# Patient Record
Sex: MALE | Race: WHITE | Employment: OTHER | URBAN - NONMETROPOLITAN AREA
[De-identification: names, ages, dates, MRNs, and addresses within clinical notes are randomized per-mention and may not be internally consistent; named-entity substitution may affect disease eponyms.]

---

## 2018-11-14 PROBLEM — Z95.820 S/P ANGIOPLASTY WITH STENT: Status: ACTIVE | Noted: 2018-11-14

## 2018-11-14 PROBLEM — E78.00 PURE HYPERCHOLESTEROLEMIA: Status: ACTIVE | Noted: 2018-11-14

## 2018-11-14 PROBLEM — I25.10 CORONARY ARTERY DISEASE INVOLVING NATIVE CORONARY ARTERY OF NATIVE HEART WITHOUT ANGINA PECTORIS: Status: ACTIVE | Noted: 2018-11-14

## 2020-01-09 PROBLEM — R00.0 SINUS TACHYCARDIA: Status: ACTIVE | Noted: 2020-01-09

## 2020-05-05 PROBLEM — R60.0 BILATERAL LEG EDEMA: Status: ACTIVE | Noted: 2020-05-05

## 2020-05-05 PROBLEM — I50.41 ACUTE COMBINED SYSTOLIC AND DIASTOLIC CONGESTIVE HEART FAILURE (HCC): Status: ACTIVE | Noted: 2020-05-05

## 2020-05-12 PROBLEM — N17.9 AKI (ACUTE KIDNEY INJURY) (HCC): Status: ACTIVE | Noted: 2020-05-12

## 2020-07-09 PROBLEM — I50.21 ACUTE SYSTOLIC CONGESTIVE HEART FAILURE (HCC): Status: ACTIVE | Noted: 2020-05-05

## 2020-07-09 PROBLEM — J44.1 COPD WITH ACUTE EXACERBATION (HCC): Status: ACTIVE | Noted: 2020-07-09

## 2020-07-09 PROBLEM — I50.23 ACUTE ON CHRONIC SYSTOLIC (CONGESTIVE) HEART FAILURE (HCC): Status: ACTIVE | Noted: 2020-07-09

## 2020-07-10 ENCOUNTER — PREP FOR PROCEDURE (OUTPATIENT)
Dept: CARDIOLOGY | Age: 65
End: 2020-07-10

## 2020-07-10 RX ORDER — SODIUM CHLORIDE 0.9 % (FLUSH) 0.9 %
10 SYRINGE (ML) INJECTION EVERY 12 HOURS SCHEDULED
Status: CANCELLED | OUTPATIENT
Start: 2020-07-10

## 2020-07-10 RX ORDER — SODIUM CHLORIDE 9 MG/ML
INJECTION, SOLUTION INTRAVENOUS CONTINUOUS
Status: CANCELLED | OUTPATIENT
Start: 2020-07-10

## 2020-07-10 RX ORDER — ASPIRIN 325 MG
325 TABLET ORAL ONCE
Status: CANCELLED | OUTPATIENT
Start: 2020-07-10 | End: 2020-07-10

## 2020-07-10 RX ORDER — DIPHENHYDRAMINE HYDROCHLORIDE 50 MG/ML
50 INJECTION INTRAMUSCULAR; INTRAVENOUS ONCE
Status: CANCELLED | OUTPATIENT
Start: 2020-07-10 | End: 2020-07-10

## 2020-07-10 RX ORDER — NITROGLYCERIN 0.4 MG/1
0.4 TABLET SUBLINGUAL EVERY 5 MIN PRN
Status: CANCELLED | OUTPATIENT
Start: 2020-07-10

## 2020-07-10 RX ORDER — SODIUM CHLORIDE 0.9 % (FLUSH) 0.9 %
10 SYRINGE (ML) INJECTION PRN
Status: CANCELLED | OUTPATIENT
Start: 2020-07-10

## 2020-07-16 PROBLEM — R94.39 ABNORMAL NUCLEAR STRESS TEST: Status: ACTIVE | Noted: 2020-07-16

## 2020-07-16 PROBLEM — I25.5 ISCHEMIC CARDIOMYOPATHY: Status: ACTIVE | Noted: 2020-07-16

## 2020-07-16 PROBLEM — N18.30 CKD (CHRONIC KIDNEY DISEASE), STAGE III (HCC): Status: ACTIVE | Noted: 2020-07-16

## 2020-07-16 PROBLEM — I50.42 CHRONIC COMBINED SYSTOLIC AND DIASTOLIC CONGESTIVE HEART FAILURE (HCC): Status: ACTIVE | Noted: 2020-07-16

## 2020-07-22 PROBLEM — K02.9 DENTAL CARIES NOTED ON EXAMINATION: Status: ACTIVE | Noted: 2017-10-12

## 2020-07-22 PROBLEM — N52.9 IMPOTENCE OF ORGANIC ORIGIN: Status: ACTIVE | Noted: 2017-01-25

## 2020-07-22 PROBLEM — G47.33 OBSTRUCTIVE SLEEP APNEA SYNDROME: Status: ACTIVE | Noted: 2019-10-15

## 2020-07-22 PROBLEM — E66.9 SIMPLE OBESITY: Status: ACTIVE | Noted: 2019-10-15

## 2020-07-22 PROBLEM — R69 ILLNESS, UNSPECIFIED: Status: ACTIVE | Noted: 2019-10-15

## 2020-07-23 PROBLEM — L97.909 VENOUS ULCER OF LOWER LEG WITHOUT VARICOSE VEINS (HCC): Status: ACTIVE | Noted: 2020-07-23

## 2020-07-23 PROBLEM — E66.01 CLASS 2 SEVERE OBESITY WITH SERIOUS COMORBIDITY AND BODY MASS INDEX (BMI) OF 38.0 TO 38.9 IN ADULT (HCC): Status: ACTIVE | Noted: 2017-11-15

## 2020-07-23 PROBLEM — I83.009 VENOUS ULCER (HCC): Status: ACTIVE | Noted: 2020-07-23

## 2020-07-23 PROBLEM — L97.919 VENOUS ULCER OF RIGHT LEG (HCC): Status: ACTIVE | Noted: 2020-07-23

## 2020-07-23 PROBLEM — L97.909 VENOUS ULCER (HCC): Status: ACTIVE | Noted: 2020-07-23

## 2020-07-23 PROBLEM — I83.019 VENOUS ULCER OF RIGHT LEG (HCC): Status: ACTIVE | Noted: 2020-07-23

## 2020-07-23 PROBLEM — I87.2 VENOUS ULCER OF LOWER LEG WITHOUT VARICOSE VEINS (HCC): Status: ACTIVE | Noted: 2020-07-23

## 2020-08-13 PROBLEM — L97.929 VENOUS STASIS ULCER OF LEFT LOWER LEG WITH EDEMA OF LEFT LOWER LEG (HCC): Status: ACTIVE | Noted: 2020-08-13

## 2020-08-13 PROBLEM — R60.0 VENOUS STASIS ULCER OF LEFT LOWER LEG WITH EDEMA OF LEFT LOWER LEG (HCC): Status: ACTIVE | Noted: 2020-08-13

## 2020-08-13 PROBLEM — I83.891 VENOUS STASIS ULCER OF RIGHT LOWER LEG WITH EDEMA OF RIGHT LOWER LEG (HCC): Status: ACTIVE | Noted: 2020-07-23

## 2020-08-13 PROBLEM — L97.919 VENOUS STASIS ULCER OF RIGHT LOWER LEG WITH EDEMA OF RIGHT LOWER LEG (HCC): Status: ACTIVE | Noted: 2020-07-23

## 2020-08-13 PROBLEM — I83.029 VENOUS STASIS ULCER OF LEFT LOWER LEG WITH EDEMA OF LEFT LOWER LEG (HCC): Status: ACTIVE | Noted: 2020-08-13

## 2020-08-13 PROBLEM — R60.0 VENOUS STASIS ULCER OF RIGHT LOWER LEG WITH EDEMA OF RIGHT LOWER LEG (HCC): Status: ACTIVE | Noted: 2020-07-23

## 2020-08-13 PROBLEM — I25.10 CORONARY ARTERY DISEASE INVOLVING NATIVE CORONARY ARTERY OF NATIVE HEART: Status: ACTIVE | Noted: 2020-08-13

## 2020-08-13 PROBLEM — R60.9 VENOUS STASIS ULCER OF LEFT LOWER LEG WITH EDEMA OF LEFT LOWER LEG (HCC): Status: ACTIVE | Noted: 2020-08-13

## 2020-08-13 PROBLEM — I83.009 VENOUS ULCER (HCC): Status: RESOLVED | Noted: 2020-07-23 | Resolved: 2020-08-13

## 2020-08-13 PROBLEM — R06.02 SOB (SHORTNESS OF BREATH) ON EXERTION: Status: ACTIVE | Noted: 2020-08-13

## 2020-08-13 PROBLEM — L97.909 VENOUS ULCER (HCC): Status: RESOLVED | Noted: 2020-07-23 | Resolved: 2020-08-13

## 2020-08-13 PROBLEM — I83.019 VENOUS STASIS ULCER OF RIGHT LOWER LEG WITH EDEMA OF RIGHT LOWER LEG (HCC): Status: ACTIVE | Noted: 2020-07-23

## 2020-08-18 ENCOUNTER — PREP FOR PROCEDURE (OUTPATIENT)
Dept: CARDIOLOGY | Age: 65
End: 2020-08-18

## 2020-08-18 RX ORDER — DIPHENHYDRAMINE HYDROCHLORIDE 50 MG/ML
50 INJECTION INTRAMUSCULAR; INTRAVENOUS ONCE
Status: CANCELLED | OUTPATIENT
Start: 2020-08-18 | End: 2020-08-18

## 2020-08-18 RX ORDER — SODIUM CHLORIDE 0.9 % (FLUSH) 0.9 %
10 SYRINGE (ML) INJECTION PRN
Status: CANCELLED | OUTPATIENT
Start: 2020-08-18

## 2020-08-18 RX ORDER — SODIUM CHLORIDE 9 MG/ML
INJECTION, SOLUTION INTRAVENOUS CONTINUOUS
Status: CANCELLED | OUTPATIENT
Start: 2020-08-18

## 2020-08-18 RX ORDER — NITROGLYCERIN 0.4 MG/1
0.4 TABLET SUBLINGUAL EVERY 5 MIN PRN
Status: CANCELLED | OUTPATIENT
Start: 2020-08-18

## 2020-08-18 RX ORDER — SODIUM CHLORIDE 0.9 % (FLUSH) 0.9 %
10 SYRINGE (ML) INJECTION EVERY 12 HOURS SCHEDULED
Status: CANCELLED | OUTPATIENT
Start: 2020-08-18

## 2020-08-18 RX ORDER — ASPIRIN 325 MG
325 TABLET ORAL ONCE
Status: CANCELLED | OUTPATIENT
Start: 2020-08-18 | End: 2020-08-18

## 2020-08-19 PROBLEM — I25.83 CORONARY ARTERY DISEASE DUE TO LIPID RICH PLAQUE: Status: ACTIVE | Noted: 2020-08-19

## 2020-08-19 PROBLEM — I25.10 CORONARY ARTERY DISEASE DUE TO LIPID RICH PLAQUE: Status: ACTIVE | Noted: 2020-08-19

## 2020-08-27 PROBLEM — R60.0 VENOUS STASIS ULCER OF LEFT LOWER LEG WITH EDEMA OF LEFT LOWER LEG (HCC): Status: RESOLVED | Noted: 2020-08-13 | Resolved: 2020-08-27

## 2020-08-27 PROBLEM — L97.919 VENOUS ULCER OF RIGHT LEG (HCC): Status: RESOLVED | Noted: 2020-07-23 | Resolved: 2020-08-27

## 2020-08-27 PROBLEM — I83.892 VENOUS STASIS ULCER OF LEFT LOWER LEG WITH EDEMA OF LEFT LOWER LEG (HCC): Status: RESOLVED | Noted: 2020-08-13 | Resolved: 2020-08-27

## 2020-08-27 PROBLEM — I83.029 VENOUS STASIS ULCER OF LEFT LOWER LEG WITH EDEMA OF LEFT LOWER LEG (HCC): Status: RESOLVED | Noted: 2020-08-13 | Resolved: 2020-08-27

## 2020-08-27 PROBLEM — R60.9 VENOUS STASIS ULCER OF LEFT LOWER LEG WITH EDEMA OF LEFT LOWER LEG (HCC): Status: RESOLVED | Noted: 2020-08-13 | Resolved: 2020-08-27

## 2020-08-27 PROBLEM — L97.929 VENOUS STASIS ULCER OF LEFT LOWER LEG WITH EDEMA OF LEFT LOWER LEG (HCC): Status: RESOLVED | Noted: 2020-08-13 | Resolved: 2020-08-27

## 2020-08-27 PROBLEM — E66.01 CLASS 2 SEVERE OBESITY DUE TO EXCESS CALORIES WITH SERIOUS COMORBIDITY IN ADULT (HCC): Status: ACTIVE | Noted: 2019-10-15

## 2020-10-05 ENCOUNTER — PREP FOR PROCEDURE (OUTPATIENT)
Dept: CARDIOLOGY | Age: 65
End: 2020-10-05

## 2020-10-05 RX ORDER — SODIUM CHLORIDE 0.9 % (FLUSH) 0.9 %
10 SYRINGE (ML) INJECTION EVERY 12 HOURS SCHEDULED
Status: CANCELLED | OUTPATIENT
Start: 2020-10-05

## 2020-10-05 RX ORDER — ASPIRIN 325 MG
325 TABLET ORAL ONCE
Status: CANCELLED | OUTPATIENT
Start: 2020-10-05 | End: 2020-10-05

## 2020-10-05 RX ORDER — NITROGLYCERIN 0.4 MG/1
0.4 TABLET SUBLINGUAL EVERY 5 MIN PRN
Status: CANCELLED | OUTPATIENT
Start: 2020-10-05

## 2020-10-05 RX ORDER — DIPHENHYDRAMINE HYDROCHLORIDE 50 MG/ML
50 INJECTION INTRAMUSCULAR; INTRAVENOUS ONCE
Status: CANCELLED | OUTPATIENT
Start: 2020-10-05 | End: 2020-10-05

## 2020-10-05 RX ORDER — SODIUM CHLORIDE 9 MG/ML
50 INJECTION, SOLUTION INTRAVENOUS CONTINUOUS
Status: CANCELLED | OUTPATIENT
Start: 2020-10-05

## 2020-10-05 RX ORDER — SODIUM CHLORIDE 0.9 % (FLUSH) 0.9 %
10 SYRINGE (ML) INJECTION PRN
Status: CANCELLED | OUTPATIENT
Start: 2020-10-05

## 2020-11-03 PROBLEM — I25.10 CORONARY ARTERY DISEASE INVOLVING NATIVE CORONARY ARTERY OF NATIVE HEART: Status: RESOLVED | Noted: 2020-08-13 | Resolved: 2020-11-03

## 2020-11-03 PROBLEM — N17.9 AKI (ACUTE KIDNEY INJURY) (HCC): Status: RESOLVED | Noted: 2020-05-12 | Resolved: 2020-11-03

## 2021-01-18 PROBLEM — Z96.659 HISTORY OF ARTHROPLASTY OF KNEE: Status: ACTIVE | Noted: 2018-03-13

## 2021-01-18 PROBLEM — E03.9 ACQUIRED HYPOTHYROIDISM: Status: ACTIVE | Noted: 2017-11-15

## 2021-01-18 PROBLEM — E66.3 OVERWEIGHT WITH BODY MASS INDEX (BMI) OF 27 TO 27.9 IN ADULT: Status: ACTIVE | Noted: 2019-10-15

## 2021-01-18 PROBLEM — K05.30 CHRONIC PERIODONTITIS, UNSPECIFIED: Status: ACTIVE | Noted: 2017-10-12

## 2021-01-18 PROBLEM — S81.809A OPEN WOUND OF LOWER LEG: Status: ACTIVE | Noted: 2021-01-05

## 2021-01-18 PROBLEM — F17.200 TOBACCO USE DISORDER: Status: ACTIVE | Noted: 2017-11-15

## 2021-01-18 PROBLEM — R35.1 NOCTURIA: Status: ACTIVE | Noted: 2018-03-13

## 2022-11-03 DIAGNOSIS — N18.32 CHRONIC KIDNEY DISEASE, STAGE 3B (HCC): ICD-10-CM

## 2022-11-03 LAB
ANION GAP SERPL CALCULATED.3IONS-SCNC: 13 MEQ/L (ref 8–16)
BUN BLDV-MCNC: 27 MG/DL (ref 7–22)
CALCIUM SERPL-MCNC: 9.6 MG/DL (ref 8.5–10.5)
CHLORIDE BLD-SCNC: 99 MEQ/L (ref 98–111)
CO2: 30 MEQ/L (ref 23–33)
CREAT SERPL-MCNC: 1.8 MG/DL (ref 0.4–1.2)
GFR SERPL CREATININE-BSD FRML MDRD: 40 ML/MIN/1.73M2
GLUCOSE BLD-MCNC: 87 MG/DL (ref 70–108)
POTASSIUM SERPL-SCNC: 3.9 MEQ/L (ref 3.5–5.2)
SODIUM BLD-SCNC: 142 MEQ/L (ref 135–145)

## 2022-11-09 NOTE — TELEPHONE ENCOUNTER
Reviewed with Dr Ciara Vasques: [facility] to switch to Effient 10 mg QD. LM for pt to return call. Please agree with verbal order.

## 2022-11-10 ENCOUNTER — TELEPHONE (OUTPATIENT)
Dept: CARDIOLOGY CLINIC | Age: 67
End: 2022-11-10

## 2022-11-10 NOTE — LETTER
[facility]  [facility]  [address]  [facility] [address]  Phone: [phone]  Fax: [phone]    IVANIA Causey CNP        November 10, 2022     [facility] [address]      Dear Shannan Bain: We missed seeing you for a scheduled appointment at [facility] with IVANIA Causey CNP on 11/10/2022. We're sorry you were unable to keep your appointment and hope that you are doing well. We ask that you please call 24 hours in advance if you are unable to make your appointment, so that we can give that time to another patient in need. We care about you and the management of your healthcare and want to make sure that you follow up as recommended. To provide quality care and timely appointments to all our patients, you may be dismissed from the practice if you do not show for three (3) scheduled appointments within a 12-month period. We would like to continue treating your healthcare needs. Please call the office to reschedule your appointment, if needed.      Sincerely,        IVANIA Causey CNP

## 2022-11-10 NOTE — Clinical Note
[facility]  [address] [address]  Phone: [phone]  Fax: [phone]    IVANIA Bosch CNP        November 10, 2022    [facility] [address]      Dear Carolina Figueroa:    ***    If you have any questions or concerns, please don't hesitate to call.     Sincerely,        IVANIA Bosch CNP

## 2022-11-14 RX ORDER — PRASUGREL 10 MG/1
10 TABLET, FILM COATED ORAL DAILY
Qty: 90 TABLET | Refills: 3 | Status: SHIPPED | OUTPATIENT
Start: 2022-11-14

## 2022-12-12 RX ORDER — ATORVASTATIN CALCIUM 40 MG/1
TABLET, FILM COATED ORAL
Qty: 90 TABLET | Refills: 0 | OUTPATIENT
Start: 2022-12-12

## 2023-01-01 ENCOUNTER — APPOINTMENT (OUTPATIENT)
Dept: CT IMAGING | Age: 68
DRG: 871 | End: 2023-01-01
Attending: INTERNAL MEDICINE
Payer: MEDICARE

## 2023-01-01 ENCOUNTER — HOSPITAL ENCOUNTER (INPATIENT)
Age: 68
LOS: 2 days | DRG: 871 | End: 2023-03-18
Attending: INTERNAL MEDICINE | Admitting: INTERNAL MEDICINE
Payer: MEDICARE

## 2023-01-01 ENCOUNTER — APPOINTMENT (OUTPATIENT)
Dept: GENERAL RADIOLOGY | Age: 68
DRG: 871 | End: 2023-01-01
Attending: INTERNAL MEDICINE
Payer: MEDICARE

## 2023-01-01 VITALS
DIASTOLIC BLOOD PRESSURE: 56 MMHG | SYSTOLIC BLOOD PRESSURE: 122 MMHG | TEMPERATURE: 100.8 F | OXYGEN SATURATION: 97 % | HEIGHT: 69 IN | WEIGHT: 261.02 LBS | BODY MASS INDEX: 38.66 KG/M2

## 2023-01-01 DIAGNOSIS — E11.21 DIABETIC NEPHROPATHY ASSOCIATED WITH TYPE 2 DIABETES MELLITUS (HCC): ICD-10-CM

## 2023-01-01 DIAGNOSIS — I12.9 HYPERTENSIVE RENAL DISEASE, STAGE 1 THROUGH STAGE 4 OR UNSPECIFIED CHRONIC KIDNEY DISEASE: ICD-10-CM

## 2023-01-01 DIAGNOSIS — N17.9 AKI (ACUTE KIDNEY INJURY) (HCC): ICD-10-CM

## 2023-01-01 DIAGNOSIS — N18.32 CHRONIC KIDNEY DISEASE, STAGE 3B (HCC): ICD-10-CM

## 2023-01-01 LAB
ACINETOBACTER CALCOACETICUS-BAUMANNII BY PCR: NOT DETECTED
ALBUMIN SERPL BCG-MCNC: 3.1 G/DL (ref 3.5–5.1)
ALP SERPL-CCNC: 493 U/L (ref 38–126)
ALT SERPL W/O P-5'-P-CCNC: 516 U/L (ref 11–66)
AMMONIA PLAS-MCNC: 126 UMOL/L (ref 11–60)
AMMONIA PLAS-MCNC: 73 UMOL/L (ref 11–60)
AMORPH SED URNS QL MICRO: ABNORMAL
AMPHETAMINES UR QL SCN: NEGATIVE
ANION GAP SERPL CALC-SCNC: 11 MEQ/L (ref 8–16)
ANION GAP SERPL CALC-SCNC: 12 MEQ/L (ref 8–16)
ANION GAP SERPL CALC-SCNC: 14 MEQ/L (ref 8–16)
ANION GAP SERPL CALC-SCNC: 15 MEQ/L (ref 8–16)
ANION GAP SERPL CALC-SCNC: 16 MEQ/L (ref 8–16)
APAP SERPL-MCNC: < 5 UG/ML (ref 0–20)
ARTERIAL PATENCY WRIST A: POSITIVE
ARTERIAL PATENCY WRIST A: POSITIVE
AST SERPL-CCNC: 784 U/L (ref 5–40)
B-OH-BUTYR SERPL-MSCNC: 3.36 MG/DL (ref 0.2–2.81)
BACTERIA: ABNORMAL
BACTERIA: ABNORMAL
BARBITURATES UR QL SCN: NEGATIVE
BASE EXCESS BLDA CALC-SCNC: -3.1 MMOL/L (ref -2.5–2.5)
BASE EXCESS BLDA CALC-SCNC: ABNORMAL MMOL/L (ref -2.5–2.5)
BASOPHILS ABSOLUTE: 0 THOU/MM3 (ref 0–0.1)
BASOPHILS NFR BLD AUTO: 0.2 %
BDY SITE: ABNORMAL
BDY SITE: ABNORMAL
BENZODIAZ UR QL SCN: NEGATIVE
BILIRUB SERPL-MCNC: 0.6 MG/DL (ref 0.3–1.2)
BILIRUB UR QL STRIP: ABNORMAL
BILIRUB UR QL STRIP: NEGATIVE
BLACTX-M ISLT/SPM QL: ABNORMAL
BLAIMP ISLT/SPM QL: ABNORMAL
BLAKPC ISLT/SPM QL: ABNORMAL
BLAOXA-48-LIKE ISLT/SPM QL: ABNORMAL
BLAVIM ISLT/SPM QL: ABNORMAL
BREATHS SETTING VENT: 16 BPM
BREATHS SETTING VENT: 16 BPM
BUN SERPL-MCNC: 55 MG/DL (ref 7–22)
BUN SERPL-MCNC: 61 MG/DL (ref 7–22)
BUN SERPL-MCNC: 63 MG/DL (ref 7–22)
BUN SERPL-MCNC: 63 MG/DL (ref 7–22)
BUN SERPL-MCNC: 67 MG/DL (ref 7–22)
BUN SERPL-MCNC: 73 MG/DL (ref 7–22)
BUN SERPL-MCNC: 80 MG/DL (ref 7–22)
BUN SERPL-MCNC: 82 MG/DL (ref 7–22)
BUN SERPL-MCNC: 89 MG/DL (ref 7–22)
BZE UR QL SCN: NEGATIVE
C PNEUM DNA LOWER RESP QL NAA+NON-PROBE: NOT DETECTED
CA-I BLD ISE-SCNC: 0.94 MMOL/L (ref 1.12–1.32)
CALCIUM SERPL-MCNC: 7.6 MG/DL (ref 8.5–10.5)
CALCIUM SERPL-MCNC: 8 MG/DL (ref 8.5–10.5)
CALCIUM SERPL-MCNC: 8 MG/DL (ref 8.5–10.5)
CALCIUM SERPL-MCNC: 8.1 MG/DL (ref 8.5–10.5)
CALCIUM SERPL-MCNC: 8.2 MG/DL (ref 8.5–10.5)
CALCIUM SERPL-MCNC: 8.2 MG/DL (ref 8.5–10.5)
CALCIUM SERPL-MCNC: 8.3 MG/DL (ref 8.5–10.5)
CALCIUM SERPL-MCNC: 8.4 MG/DL (ref 8.5–10.5)
CALCIUM SERPL-MCNC: 8.7 MG/DL (ref 8.5–10.5)
CANNABINOIDS UR QL SCN: POSITIVE
CASTS #/AREA URNS LPF: ABNORMAL /LPF
CHARACTER UR: ABNORMAL
CHARACTER UR: ABNORMAL
CHARCOAL URNS QL MICRO: ABNORMAL
CHARCOAL URNS QL MICRO: ABNORMAL
CHLORIDE SERPL-SCNC: 101 MEQ/L (ref 98–111)
CHLORIDE SERPL-SCNC: 103 MEQ/L (ref 98–111)
CHLORIDE SERPL-SCNC: 104 MEQ/L (ref 98–111)
CHLORIDE SERPL-SCNC: 104 MEQ/L (ref 98–111)
CHLORIDE SERPL-SCNC: 105 MEQ/L (ref 98–111)
CHLORIDE SERPL-SCNC: 106 MEQ/L (ref 98–111)
CHLORIDE SERPL-SCNC: 107 MEQ/L (ref 98–111)
CHLORIDE SERPL-SCNC: 107 MEQ/L (ref 98–111)
CHLORIDE SERPL-SCNC: 98 MEQ/L (ref 98–111)
CK SERPL-CCNC: 322 U/L (ref 55–170)
CO2 SERPL-SCNC: 22 MEQ/L (ref 23–33)
CO2 SERPL-SCNC: 24 MEQ/L (ref 23–33)
CO2 SERPL-SCNC: 24 MEQ/L (ref 23–33)
CO2 SERPL-SCNC: 25 MEQ/L (ref 23–33)
CO2 SERPL-SCNC: 25 MEQ/L (ref 23–33)
CO2 SERPL-SCNC: 26 MEQ/L (ref 23–33)
CO2 SERPL-SCNC: 27 MEQ/L (ref 23–33)
CO2 SERPL-SCNC: 27 MEQ/L (ref 23–33)
CO2 SERPL-SCNC: 30 MEQ/L (ref 23–33)
COLLECTED BY:: ABNORMAL
COLLECTED BY:: ABNORMAL
COLOR UR: ABNORMAL
COLOR UR: ABNORMAL
CREAT SERPL-MCNC: 2.2 MG/DL (ref 0.4–1.2)
CREAT SERPL-MCNC: 2.5 MG/DL (ref 0.4–1.2)
CREAT SERPL-MCNC: 2.5 MG/DL (ref 0.4–1.2)
CREAT SERPL-MCNC: 2.7 MG/DL (ref 0.4–1.2)
CREAT SERPL-MCNC: 3 MG/DL (ref 0.4–1.2)
CREAT SERPL-MCNC: 3.7 MG/DL (ref 0.4–1.2)
CREAT SERPL-MCNC: 4 MG/DL (ref 0.4–1.2)
CREAT SERPL-MCNC: 4.2 MG/DL (ref 0.4–1.2)
CREAT SERPL-MCNC: 4.9 MG/DL (ref 0.4–1.2)
CREATININE URINE: 84.7 MG/DL
CRYSTALS URNS QL MICRO: ABNORMAL
CRYSTALS URNS QL MICRO: ABNORMAL
DEPRECATED MEAN GLUCOSE BLD GHB EST-ACNC: 198 MG/DL (ref 70–126)
DEPRECATED RDW RBC AUTO: 61.2 FL (ref 35–45)
DEVICE: ABNORMAL
DEVICE: ABNORMAL
EKG ATRIAL RATE: 82 BPM
EKG ATRIAL RATE: 89 BPM
EKG P AXIS: 70 DEGREES
EKG P AXIS: 78 DEGREES
EKG P-R INTERVAL: 206 MS
EKG P-R INTERVAL: 236 MS
EKG Q-T INTERVAL: 380 MS
EKG Q-T INTERVAL: 402 MS
EKG QRS DURATION: 102 MS
EKG QRS DURATION: 98 MS
EKG QTC CALCULATION (BAZETT): 462 MS
EKG QTC CALCULATION (BAZETT): 469 MS
EKG R AXIS: -75 DEGREES
EKG R AXIS: -82 DEGREES
EKG T AXIS: 78 DEGREES
EKG T AXIS: 89 DEGREES
EKG VENTRICULAR RATE: 82 BPM
EKG VENTRICULAR RATE: 89 BPM
ENTEROBACTER CLOACAE COMPLEX BY PCR: NOT DETECTED
EOSINOPHIL NFR BLD AUTO: 0.1 %
EOSINOPHILS ABSOLUTE: 0 THOU/MM3 (ref 0–0.4)
EPITHELIAL CELLS, UA: ABNORMAL /HPF
EPITHELIAL CELLS, UA: ABNORMAL /HPF
ERYTHROCYTE [DISTWIDTH] IN BLOOD BY AUTOMATED COUNT: 18.2 % (ref 11.5–14.5)
ESCHERICHIA COLI BY PCR: NOT DETECTED
ETHANOL SERPL-MCNC: < 0.01 %
FENTANYL: POSITIVE
FIO2 ON VENT O2 ANALYZER: 100 %
FIO2 ON VENT O2 ANALYZER: 90 %
FLUAV RNA LOWER RESP QL NAA+NON-PROBE: NOT DETECTED
FLUAV RNA RESP QL NAA+PROBE: NOT DETECTED
FLUBV RNA LOWER RESP QL NAA+NON-PROBE: NOT DETECTED
FLUBV RNA RESP QL NAA+PROBE: NOT DETECTED
GFR SERPL CREATININE-BSD FRML MDRD: 12 ML/MIN/1.73M2
GFR SERPL CREATININE-BSD FRML MDRD: 15 ML/MIN/1.73M2
GFR SERPL CREATININE-BSD FRML MDRD: 15 ML/MIN/1.73M2
GFR SERPL CREATININE-BSD FRML MDRD: 17 ML/MIN/1.73M2
GFR SERPL CREATININE-BSD FRML MDRD: 22 ML/MIN/1.73M2
GFR SERPL CREATININE-BSD FRML MDRD: 25 ML/MIN/1.73M2
GFR SERPL CREATININE-BSD FRML MDRD: 27 ML/MIN/1.73M2
GFR SERPL CREATININE-BSD FRML MDRD: 27 ML/MIN/1.73M2
GFR SERPL CREATININE-BSD FRML MDRD: 32 ML/MIN/1.73M2
GLUCOSE BLD STRIP.AUTO-MCNC: 106 MG/DL (ref 70–108)
GLUCOSE BLD STRIP.AUTO-MCNC: 113 MG/DL (ref 70–108)
GLUCOSE BLD STRIP.AUTO-MCNC: 148 MG/DL (ref 70–108)
GLUCOSE BLD STRIP.AUTO-MCNC: 159 MG/DL (ref 70–108)
GLUCOSE BLD STRIP.AUTO-MCNC: 221 MG/DL (ref 70–108)
GLUCOSE BLD STRIP.AUTO-MCNC: 236 MG/DL (ref 70–108)
GLUCOSE BLD STRIP.AUTO-MCNC: 302 MG/DL (ref 70–108)
GLUCOSE BLD STRIP.AUTO-MCNC: 323 MG/DL (ref 70–108)
GLUCOSE BLD STRIP.AUTO-MCNC: 349 MG/DL (ref 70–108)
GLUCOSE BLD STRIP.AUTO-MCNC: 390 MG/DL (ref 70–108)
GLUCOSE BLD STRIP.AUTO-MCNC: 413 MG/DL (ref 70–108)
GLUCOSE BLD STRIP.AUTO-MCNC: 68 MG/DL (ref 70–108)
GLUCOSE BLD STRIP.AUTO-MCNC: 89 MG/DL (ref 70–108)
GLUCOSE BLD STRIP.AUTO-MCNC: 95 MG/DL (ref 70–108)
GLUCOSE SERPL-MCNC: 119 MG/DL (ref 70–108)
GLUCOSE SERPL-MCNC: 193 MG/DL (ref 70–108)
GLUCOSE SERPL-MCNC: 254 MG/DL (ref 70–108)
GLUCOSE SERPL-MCNC: 273 MG/DL (ref 70–108)
GLUCOSE SERPL-MCNC: 318 MG/DL (ref 70–108)
GLUCOSE SERPL-MCNC: 326 MG/DL (ref 70–108)
GLUCOSE SERPL-MCNC: 457 MG/DL (ref 70–108)
GLUCOSE SERPL-MCNC: 467 MG/DL (ref 70–108)
GLUCOSE SERPL-MCNC: 489 MG/DL (ref 70–108)
GLUCOSE UR QL STRIP.AUTO: >= 1000 MG/DL
GLUCOSE UR QL STRIP.AUTO: >= 1000 MG/DL
HADV DNA LOWER RESP QL NAA+NON-PROBE: NOT DETECTED
HAEMOPHILUS INFLUENZAE BY PCR: NOT DETECTED
HBA1C MFR BLD HPLC: 8.6 % (ref 4.4–6.4)
HCO3 BLDA-SCNC: 32 MMOL/L (ref 23–28)
HCO3 BLDA-SCNC: ABNORMAL MMOL/L (ref 23–28)
HCOV RNA LOWER RESP QL NAA+NON-PROBE: NOT DETECTED
HCT VFR BLD AUTO: 59.9 % (ref 42–52)
HGB BLD-MCNC: 16.6 GM/DL (ref 14–18)
HGB UR QL STRIP.AUTO: ABNORMAL
HGB UR QL STRIP.AUTO: ABNORMAL
HGB UR QL STRIP.AUTO: POSITIVE
HMPV RNA LOWER RESP QL NAA+NON-PROBE: NOT DETECTED
HPIV RNA LOWER RESP QL NAA+NON-PROBE: NOT DETECTED
HYPOCHROMIA BLD QL SMEAR: PRESENT
ICTOTEST: NEGATIVE
IMM GRANULOCYTES # BLD AUTO: 1.04 THOU/MM3 (ref 0–0.07)
IMM GRANULOCYTES NFR BLD AUTO: 4.4 %
INR PPP: 1.22 (ref 0.85–1.13)
KETONES UR QL STRIP.AUTO: NEGATIVE
KETONES UR QL STRIP.AUTO: NEGATIVE
KLEBSIELLA AEROGENES BY PCR: NOT DETECTED
KLEBSIELLA OXYTOCA BY PCR: NOT DETECTED
KLEBSIELLA PNEUMONIAE GROUP BY PCR: NOT DETECTED
L PNEUMO DNA LOWER RESP QL NAA+NON-PROBE: NOT DETECTED
LACTATE SERPL-SCNC: 1.5 MMOL/L (ref 0.5–2)
LACTATE SERPL-SCNC: 1.6 MMOL/L (ref 0.5–2)
LACTATE SERPL-SCNC: 2 MMOL/L (ref 0.5–2)
LACTATE SERPL-SCNC: 2.3 MMOL/L (ref 0.5–2)
LACTATE SERPL-SCNC: 2.6 MMOL/L (ref 0.5–2)
LACTATE SERPL-SCNC: 4.6 MMOL/L (ref 0.5–2)
LEUKOCYTE ESTERASE UR QL STRIP.AUTO: ABNORMAL
LEUKOCYTE ESTERASE UR QL STRIP.AUTO: NEGATIVE
LV EF: 28 %
LVEF MODALITY: NORMAL
LYMPHOCYTES ABSOLUTE: 1.5 THOU/MM3 (ref 1–4.8)
LYMPHOCYTES NFR BLD AUTO: 6.2 %
M PNEUMO DNA LOWER RESP QL NAA+NON-PROBE: NOT DETECTED
MAGNESIUM SERPL-MCNC: 2.9 MG/DL (ref 1.6–2.4)
MCH RBC QN AUTO: 26.6 PG (ref 26–33)
MCHC RBC AUTO-ENTMCNC: 27.7 GM/DL (ref 32.2–35.5)
MCV RBC AUTO: 95.8 FL (ref 80–94)
MONOCYTES ABSOLUTE: 1.6 THOU/MM3 (ref 0.4–1.3)
MONOCYTES NFR BLD AUTO: 6.9 %
MORAXELLA CATARRHALIS BY PCR: DETECTED
MRSA DNA SPEC QL NAA+PROBE: NEGATIVE
NEUTROPHILS NFR BLD AUTO: 82.2 %
NITRITE UR QL STRIP.AUTO: NEGATIVE
NITRITE UR QL STRIP.AUTO: NEGATIVE
NRBC BLD AUTO-RTO: 0 /100 WBC
OPIATES UR QL SCN: NEGATIVE
OXYCODONE: NEGATIVE
PCO2 BLDA: 103 MMHG (ref 35–45)
PCO2 BLDA: 149 MMHG (ref 35–45)
PCP UR QL SCN: NEGATIVE
PEEP SETTING VENT: 10 MMHG
PEEP SETTING VENT: 10 MMHG
PH BLDA: 6.97 [PH] (ref 7.35–7.45)
PH BLDA: 7.1 [PH] (ref 7.35–7.45)
PH UR STRIP.AUTO: 5 [PH] (ref 5–9)
PH UR STRIP.AUTO: 5.5 [PH] (ref 5–9)
PHOSPHATE SERPL-MCNC: 8.1 MG/DL (ref 2.4–4.7)
PHOSPHATE SERPL-MCNC: 9.1 MG/DL (ref 2.4–4.7)
PIP: 30 CMH2O
PIP: 32 CMH2O
PLATELET # BLD AUTO: 374 THOU/MM3 (ref 130–400)
PLATELET BLD QL SMEAR: ADEQUATE
PMV BLD AUTO: 9.5 FL (ref 9.4–12.4)
PO2 BLDA: 111 MMHG (ref 71–104)
PO2 BLDA: 140 MMHG (ref 71–104)
POC CREATININE WHOLE BLOOD: 2.6 MG/DL (ref 0.5–1.2)
POLYCHROMASIA BLD QL SMEAR: ABNORMAL
POTASSIUM SERPL-SCNC: 5.4 MEQ/L (ref 3.5–5.2)
POTASSIUM SERPL-SCNC: 5.5 MEQ/L (ref 3.5–5.2)
POTASSIUM SERPL-SCNC: 5.7 MEQ/L (ref 3.5–5.2)
POTASSIUM SERPL-SCNC: 5.9 MEQ/L (ref 3.5–5.2)
POTASSIUM SERPL-SCNC: 6 MEQ/L (ref 3.5–5.2)
POTASSIUM SERPL-SCNC: 6.1 MEQ/L (ref 3.5–5.2)
POTASSIUM SERPL-SCNC: 6.1 MEQ/L (ref 3.5–5.2)
POTASSIUM SERPL-SCNC: 6.3 MEQ/L (ref 3.5–5.2)
POTASSIUM SERPL-SCNC: 7.1 MEQ/L (ref 3.5–5.2)
PRESSURE SUPPORT SETTING VENT: 20 CMH2O
PRESSURE SUPPORT SETTING VENT: 22 CMH2O
PROCALCITONIN SERPL IA-MCNC: 0.23 NG/ML (ref 0.01–0.09)
PROT SERPL-MCNC: 7.3 G/DL (ref 6.1–8)
PROT UR STRIP.AUTO-MCNC: 300 MG/DL
PROT UR STRIP.AUTO-MCNC: >= 300 MG/DL
PROT/CREAT RATIO, UR: 1.93
PROTEIN, URINE: 163.4 MG/DL
PROTEUS SPECIES BY PCR: NOT DETECTED
PSEUDOMONAS AERUGINOSA BY PCR: NOT DETECTED
RBC # BLD AUTO: 6.25 MILL/MM3 (ref 4.7–6.1)
RBC #/AREA URNS HPF: > 100 /HPF
RBC #/AREA URNS HPF: > 200 /HPF
RENAL EPI CELLS #/AREA URNS HPF: ABNORMAL /[HPF]
RENAL EPI CELLS #/AREA URNS HPF: ABNORMAL /[HPF]
RESISTANT GENE MECA/C & MREJ BY PCR: ABNORMAL
RESISTANT GENE NDM BY PCR: ABNORMAL
RSV RNA LOWER RESP QL NAA+NON-PROBE: NOT DETECTED
RV+EV RNA LOWER RESP QL NAA+NON-PROBE: NOT DETECTED
SALICYLATES SERPL-MCNC: < 0.3 MG/DL (ref 2–10)
SAO2 % BLDA: 98 %
SAO2 % BLDA: ABNORMAL %
SARS-COV-2 RNA RESP QL NAA+PROBE: NOT DETECTED
SCAN OF BLOOD SMEAR: NORMAL
SEGMENTED NEUTROPHILS ABSOLUTE COUNT: 19.2 THOU/MM3 (ref 1.8–7.7)
SERRATIA MARCESCENS BY PCR: NOT DETECTED
SODIUM SERPL-SCNC: 138 MEQ/L (ref 135–145)
SODIUM SERPL-SCNC: 139 MEQ/L (ref 135–145)
SODIUM SERPL-SCNC: 141 MEQ/L (ref 135–145)
SODIUM SERPL-SCNC: 142 MEQ/L (ref 135–145)
SODIUM SERPL-SCNC: 143 MEQ/L (ref 135–145)
SODIUM SERPL-SCNC: 147 MEQ/L (ref 135–145)
SODIUM SERPL-SCNC: 149 MEQ/L (ref 135–145)
SODIUM SERPL-SCNC: 149 MEQ/L (ref 135–145)
SOURCE: ABNORMAL
SP GR UR REFRACT.AUTO: >= 1.03 (ref 1–1.03)
SPECIFIC GRAVITY UA: 1.02 (ref 1–1.03)
SPECIMEN ACCEPTABILITY: ABNORMAL
STAPH AUREUS BY PCR: NOT DETECTED
STREP AGALACTIAE BY PCR: NOT DETECTED
STREP PNEUMONIAE BY PCR: NOT DETECTED
STREP PYOGENES BY PCR: NOT DETECTED
T4 FREE SERPL-MCNC: 0.95 NG/DL (ref 0.93–1.76)
TROPONIN T: 0.12 NG/ML
TROPONIN T: 0.16 NG/ML
TSH SERPL DL<=0.005 MIU/L-ACNC: 12.19 UIU/ML (ref 0.4–4.2)
UROBILINOGEN UR QL STRIP.AUTO: 1 EU/DL (ref 0–1)
UROBILINOGEN, URINE: 1 EU/DL (ref 0–1)
VANA ISLT/SPM QL: NEGATIVE
VARIANT LYMPHS BLD QL SMEAR: ABNORMAL %
VENTILATION MODE VENT: ABNORMAL
VENTILATION MODE VENT: ABNORMAL
WBC # BLD AUTO: 23.4 THOU/MM3 (ref 4.8–10.8)
WBC #/AREA URNS HPF: ABNORMAL /HPF
WBC #/AREA URNS HPF: ABNORMAL /HPF
YEAST LIKE FUNGI URNS QL MICRO: ABNORMAL
YEAST LIKE FUNGI URNS QL MICRO: ABNORMAL

## 2023-01-01 PROCEDURE — 87541 LEGION PNEUMO DNA AMP PROB: CPT

## 2023-01-01 PROCEDURE — 2580000003 HC RX 258: Performed by: EMERGENCY MEDICINE

## 2023-01-01 PROCEDURE — 82140 ASSAY OF AMMONIA: CPT

## 2023-01-01 PROCEDURE — 82077 ASSAY SPEC XCP UR&BREATH IA: CPT

## 2023-01-01 PROCEDURE — 87631 RESP VIRUS 3-5 TARGETS: CPT

## 2023-01-01 PROCEDURE — 94003 VENT MGMT INPAT SUBQ DAY: CPT

## 2023-01-01 PROCEDURE — 89220 SPUTUM SPECIMEN COLLECTION: CPT

## 2023-01-01 PROCEDURE — 2500000003 HC RX 250 WO HCPCS: Performed by: NURSE PRACTITIONER

## 2023-01-01 PROCEDURE — 2000000000 HC ICU R&B

## 2023-01-01 PROCEDURE — 84439 ASSAY OF FREE THYROXINE: CPT

## 2023-01-01 PROCEDURE — 36415 COLL VENOUS BLD VENIPUNCTURE: CPT

## 2023-01-01 PROCEDURE — 84443 ASSAY THYROID STIM HORMONE: CPT

## 2023-01-01 PROCEDURE — 84295 ASSAY OF SERUM SODIUM: CPT

## 2023-01-01 PROCEDURE — 6370000000 HC RX 637 (ALT 250 FOR IP): Performed by: NURSE PRACTITIONER

## 2023-01-01 PROCEDURE — 82948 REAGENT STRIP/BLOOD GLUCOSE: CPT

## 2023-01-01 PROCEDURE — 6370000000 HC RX 637 (ALT 250 FOR IP): Performed by: EMERGENCY MEDICINE

## 2023-01-01 PROCEDURE — 74176 CT ABD & PELVIS W/O CONTRAST: CPT

## 2023-01-01 PROCEDURE — 87500 VANOMYCIN DNA AMP PROBE: CPT

## 2023-01-01 PROCEDURE — 71250 CT THORAX DX C-: CPT

## 2023-01-01 PROCEDURE — 93005 ELECTROCARDIOGRAM TRACING: CPT | Performed by: NURSE PRACTITIONER

## 2023-01-01 PROCEDURE — 84132 ASSAY OF SERUM POTASSIUM: CPT

## 2023-01-01 PROCEDURE — 80179 DRUG ASSAY SALICYLATE: CPT

## 2023-01-01 PROCEDURE — 0BH17EZ INSERTION OF ENDOTRACHEAL AIRWAY INTO TRACHEA, VIA NATURAL OR ARTIFICIAL OPENING: ICD-10-PCS | Performed by: INTERNAL MEDICINE

## 2023-01-01 PROCEDURE — 87641 MR-STAPH DNA AMP PROBE: CPT

## 2023-01-01 PROCEDURE — 83874 ASSAY OF MYOGLOBIN: CPT

## 2023-01-01 PROCEDURE — 82330 ASSAY OF CALCIUM: CPT

## 2023-01-01 PROCEDURE — 94002 VENT MGMT INPAT INIT DAY: CPT

## 2023-01-01 PROCEDURE — 5A1945Z RESPIRATORY VENTILATION, 24-96 CONSECUTIVE HOURS: ICD-10-PCS | Performed by: INTERNAL MEDICINE

## 2023-01-01 PROCEDURE — 2500000003 HC RX 250 WO HCPCS: Performed by: EMERGENCY MEDICINE

## 2023-01-01 PROCEDURE — A4216 STERILE WATER/SALINE, 10 ML: HCPCS | Performed by: NURSE PRACTITIONER

## 2023-01-01 PROCEDURE — 6370000000 HC RX 637 (ALT 250 FOR IP): Performed by: INTERNAL MEDICINE

## 2023-01-01 PROCEDURE — 94761 N-INVAS EAR/PLS OXIMETRY MLT: CPT

## 2023-01-01 PROCEDURE — 6360000002 HC RX W HCPCS: Performed by: NURSE PRACTITIONER

## 2023-01-01 PROCEDURE — 99291 CRITICAL CARE FIRST HOUR: CPT | Performed by: INTERNAL MEDICINE

## 2023-01-01 PROCEDURE — 83735 ASSAY OF MAGNESIUM: CPT

## 2023-01-01 PROCEDURE — 81001 URINALYSIS AUTO W/SCOPE: CPT

## 2023-01-01 PROCEDURE — 82010 KETONE BODYS QUAN: CPT

## 2023-01-01 PROCEDURE — 82550 ASSAY OF CK (CPK): CPT

## 2023-01-01 PROCEDURE — 70450 CT HEAD/BRAIN W/O DYE: CPT

## 2023-01-01 PROCEDURE — 87077 CULTURE AEROBIC IDENTIFY: CPT

## 2023-01-01 PROCEDURE — 87486 CHLMYD PNEUM DNA AMP PROBE: CPT

## 2023-01-01 PROCEDURE — 2580000003 HC RX 258: Performed by: NURSE PRACTITIONER

## 2023-01-01 PROCEDURE — 85025 COMPLETE CBC W/AUTO DIFF WBC: CPT

## 2023-01-01 PROCEDURE — 80048 BASIC METABOLIC PNL TOTAL CA: CPT

## 2023-01-01 PROCEDURE — 2720000010 HC SURG SUPPLY STERILE

## 2023-01-01 PROCEDURE — 87205 SMEAR GRAM STAIN: CPT

## 2023-01-01 PROCEDURE — 82947 ASSAY GLUCOSE BLOOD QUANT: CPT

## 2023-01-01 PROCEDURE — 51702 INSERT TEMP BLADDER CATH: CPT

## 2023-01-01 PROCEDURE — 94640 AIRWAY INHALATION TREATMENT: CPT

## 2023-01-01 PROCEDURE — 95718 EEG PHYS/QHP 2-12 HR W/VEEG: CPT | Performed by: PSYCHIATRY & NEUROLOGY

## 2023-01-01 PROCEDURE — 6360000002 HC RX W HCPCS: Performed by: EMERGENCY MEDICINE

## 2023-01-01 PROCEDURE — 87070 CULTURE OTHR SPECIMN AEROBIC: CPT

## 2023-01-01 PROCEDURE — 82803 BLOOD GASES ANY COMBINATION: CPT

## 2023-01-01 PROCEDURE — 95819 EEG AWAKE AND ASLEEP: CPT

## 2023-01-01 PROCEDURE — 83036 HEMOGLOBIN GLYCOSYLATED A1C: CPT

## 2023-01-01 PROCEDURE — 36600 WITHDRAWAL OF ARTERIAL BLOOD: CPT

## 2023-01-01 PROCEDURE — 80307 DRUG TEST PRSMV CHEM ANLYZR: CPT

## 2023-01-01 PROCEDURE — 83605 ASSAY OF LACTIC ACID: CPT

## 2023-01-01 PROCEDURE — 99239 HOSP IP/OBS DSCHRG MGMT >30: CPT | Performed by: NURSE PRACTITIONER

## 2023-01-01 PROCEDURE — 93010 ELECTROCARDIOGRAM REPORT: CPT | Performed by: INTERNAL MEDICINE

## 2023-01-01 PROCEDURE — 2500000003 HC RX 250 WO HCPCS

## 2023-01-01 PROCEDURE — 95813 EEG EXTND MNTR 61-119 MIN: CPT | Performed by: PSYCHIATRY & NEUROLOGY

## 2023-01-01 PROCEDURE — 84484 ASSAY OF TROPONIN QUANT: CPT

## 2023-01-01 PROCEDURE — 80053 COMPREHEN METABOLIC PANEL: CPT

## 2023-01-01 PROCEDURE — 87798 DETECT AGENT NOS DNA AMP: CPT

## 2023-01-01 PROCEDURE — 84145 PROCALCITONIN (PCT): CPT

## 2023-01-01 PROCEDURE — 80143 DRUG ASSAY ACETAMINOPHEN: CPT

## 2023-01-01 PROCEDURE — 87581 M.PNEUMON DNA AMP PROBE: CPT

## 2023-01-01 PROCEDURE — 2700000000 HC OXYGEN THERAPY PER DAY

## 2023-01-01 PROCEDURE — 82565 ASSAY OF CREATININE: CPT

## 2023-01-01 PROCEDURE — 87086 URINE CULTURE/COLONY COUNT: CPT

## 2023-01-01 PROCEDURE — APPNB180 APP NON BILLABLE TIME > 60 MINS: Performed by: NURSE PRACTITIONER

## 2023-01-01 PROCEDURE — 82435 ASSAY OF BLOOD CHLORIDE: CPT

## 2023-01-01 PROCEDURE — 95705 EEG W/O VID 2-12 HR UNMNTR: CPT

## 2023-01-01 PROCEDURE — 95819 EEG AWAKE AND ASLEEP: CPT | Performed by: EMERGENCY MEDICINE

## 2023-01-01 PROCEDURE — 84100 ASSAY OF PHOSPHORUS: CPT

## 2023-01-01 PROCEDURE — 71045 X-RAY EXAM CHEST 1 VIEW: CPT

## 2023-01-01 PROCEDURE — 87636 SARSCOV2 & INF A&B AMP PRB: CPT

## 2023-01-01 PROCEDURE — 93306 TTE W/DOPPLER COMPLETE: CPT

## 2023-01-01 PROCEDURE — 95711 VEEG 2-12 HR UNMONITORED: CPT

## 2023-01-01 PROCEDURE — 85610 PROTHROMBIN TIME: CPT

## 2023-01-01 RX ORDER — DEXTROSE MONOHYDRATE 100 MG/ML
INJECTION, SOLUTION INTRAVENOUS CONTINUOUS PRN
Status: DISCONTINUED | OUTPATIENT
Start: 2023-01-01 | End: 2023-01-01

## 2023-01-01 RX ORDER — ONDANSETRON 2 MG/ML
4 INJECTION INTRAMUSCULAR; INTRAVENOUS EVERY 6 HOURS PRN
Status: DISCONTINUED | OUTPATIENT
Start: 2023-01-01 | End: 2023-03-18 | Stop reason: HOSPADM

## 2023-01-01 RX ORDER — ACETAMINOPHEN 650 MG/1
650 SUPPOSITORY RECTAL EVERY 6 HOURS PRN
Status: DISCONTINUED | OUTPATIENT
Start: 2023-01-01 | End: 2023-03-18 | Stop reason: HOSPADM

## 2023-01-01 RX ORDER — CALCIUM GLUCONATE 10 MG/ML
1000 INJECTION, SOLUTION INTRAVENOUS ONCE
Status: COMPLETED | OUTPATIENT
Start: 2023-01-01 | End: 2023-01-01

## 2023-01-01 RX ORDER — SODIUM CHLORIDE 0.9 % (FLUSH) 0.9 %
5-40 SYRINGE (ML) INJECTION PRN
Status: DISCONTINUED | OUTPATIENT
Start: 2023-01-01 | End: 2023-03-18 | Stop reason: HOSPADM

## 2023-01-01 RX ORDER — SODIUM CHLORIDE 9 MG/ML
INJECTION, SOLUTION INTRAVENOUS PRN
Status: DISCONTINUED | OUTPATIENT
Start: 2023-01-01 | End: 2023-03-18 | Stop reason: HOSPADM

## 2023-01-01 RX ORDER — MORPHINE SULFATE 4 MG/ML
4 INJECTION, SOLUTION INTRAMUSCULAR; INTRAVENOUS ONCE
Status: COMPLETED | OUTPATIENT
Start: 2023-01-01 | End: 2023-01-01

## 2023-01-01 RX ORDER — DEXTROSE MONOHYDRATE 25 G/50ML
25 INJECTION, SOLUTION INTRAVENOUS ONCE
Status: COMPLETED | OUTPATIENT
Start: 2023-01-01 | End: 2023-01-01

## 2023-01-01 RX ORDER — SODIUM CHLORIDE 450 MG/100ML
INJECTION, SOLUTION INTRAVENOUS CONTINUOUS
Status: DISCONTINUED | OUTPATIENT
Start: 2023-01-01 | End: 2023-01-01

## 2023-01-01 RX ORDER — SODIUM CHLORIDE 9 MG/ML
INJECTION, SOLUTION INTRAVENOUS CONTINUOUS
Status: DISCONTINUED | OUTPATIENT
Start: 2023-01-01 | End: 2023-03-18 | Stop reason: HOSPADM

## 2023-01-01 RX ORDER — POLYVINYL ALCOHOL 14 MG/ML
1 SOLUTION/ DROPS OPHTHALMIC EVERY 4 HOURS
Status: DISCONTINUED | OUTPATIENT
Start: 2023-01-01 | End: 2023-03-18 | Stop reason: HOSPADM

## 2023-01-01 RX ORDER — SODIUM CHLORIDE 0.9 % (FLUSH) 0.9 %
5-40 SYRINGE (ML) INJECTION EVERY 12 HOURS SCHEDULED
Status: DISCONTINUED | OUTPATIENT
Start: 2023-01-01 | End: 2023-03-18 | Stop reason: HOSPADM

## 2023-01-01 RX ORDER — SODIUM CHLORIDE 9 MG/ML
INJECTION, SOLUTION INTRAVENOUS CONTINUOUS
Status: DISCONTINUED | OUTPATIENT
Start: 2023-01-01 | End: 2023-01-01

## 2023-01-01 RX ORDER — NOREPINEPHRINE BIT/0.9 % NACL 16MG/250ML
INFUSION BOTTLE (ML) INTRAVENOUS
Status: COMPLETED
Start: 2023-01-01 | End: 2023-01-01

## 2023-01-01 RX ORDER — 0.9 % SODIUM CHLORIDE 0.9 %
30 INTRAVENOUS SOLUTION INTRAVENOUS ONCE
Status: COMPLETED | OUTPATIENT
Start: 2023-01-01 | End: 2023-01-01

## 2023-01-01 RX ORDER — ALBUTEROL SULFATE 2.5 MG/3ML
2.5 SOLUTION RESPIRATORY (INHALATION) EVERY 4 HOURS PRN
Status: DISCONTINUED | OUTPATIENT
Start: 2023-01-01 | End: 2023-03-18 | Stop reason: HOSPADM

## 2023-01-01 RX ORDER — ONDANSETRON 4 MG/1
4 TABLET, ORALLY DISINTEGRATING ORAL EVERY 8 HOURS PRN
Status: DISCONTINUED | OUTPATIENT
Start: 2023-01-01 | End: 2023-03-18 | Stop reason: HOSPADM

## 2023-01-01 RX ORDER — METOLAZONE 2.5 MG/1
TABLET ORAL
Qty: 8 TABLET | Refills: 3 | OUTPATIENT
Start: 2023-01-01

## 2023-01-01 RX ORDER — 3% SODIUM CHLORIDE 3 G/100ML
25 INJECTION, SOLUTION INTRAVENOUS CONTINUOUS
Status: DISCONTINUED | OUTPATIENT
Start: 2023-01-01 | End: 2023-01-01

## 2023-01-01 RX ORDER — DEXTROSE AND SODIUM CHLORIDE 5; .45 G/100ML; G/100ML
INJECTION, SOLUTION INTRAVENOUS CONTINUOUS
Status: DISCONTINUED | OUTPATIENT
Start: 2023-01-01 | End: 2023-01-01

## 2023-01-01 RX ORDER — FUROSEMIDE 10 MG/ML
40 INJECTION INTRAMUSCULAR; INTRAVENOUS ONCE
Status: COMPLETED | OUTPATIENT
Start: 2023-01-01 | End: 2023-01-01

## 2023-01-01 RX ORDER — CHLORHEXIDINE GLUCONATE 0.12 MG/ML
15 RINSE ORAL 2 TIMES DAILY
Status: DISCONTINUED | OUTPATIENT
Start: 2023-01-01 | End: 2023-03-18 | Stop reason: HOSPADM

## 2023-01-01 RX ORDER — MINERAL OIL AND WHITE PETROLATUM 150; 830 MG/G; MG/G
OINTMENT OPHTHALMIC EVERY 4 HOURS
Status: DISCONTINUED | OUTPATIENT
Start: 2023-01-01 | End: 2023-03-18 | Stop reason: HOSPADM

## 2023-01-01 RX ORDER — LEVOFLOXACIN 5 MG/ML
750 INJECTION, SOLUTION INTRAVENOUS EVERY 24 HOURS
Status: DISCONTINUED | OUTPATIENT
Start: 2023-01-01 | End: 2023-01-01

## 2023-01-01 RX ORDER — HYDRALAZINE HYDROCHLORIDE 20 MG/ML
10 INJECTION INTRAMUSCULAR; INTRAVENOUS ONCE
Status: DISCONTINUED | OUTPATIENT
Start: 2023-01-01 | End: 2023-01-01

## 2023-01-01 RX ORDER — POLYETHYLENE GLYCOL 3350 17 G/17G
17 POWDER, FOR SOLUTION ORAL DAILY PRN
Status: DISCONTINUED | OUTPATIENT
Start: 2023-01-01 | End: 2023-03-18 | Stop reason: HOSPADM

## 2023-01-01 RX ORDER — MORPHINE SULFATE 2 MG/ML
2 INJECTION, SOLUTION INTRAMUSCULAR; INTRAVENOUS
Status: DISCONTINUED | OUTPATIENT
Start: 2023-01-01 | End: 2023-03-18 | Stop reason: HOSPADM

## 2023-01-01 RX ORDER — ACETAMINOPHEN 325 MG/1
650 TABLET ORAL EVERY 6 HOURS PRN
Status: DISCONTINUED | OUTPATIENT
Start: 2023-01-01 | End: 2023-03-18 | Stop reason: HOSPADM

## 2023-01-01 RX ORDER — MAGNESIUM SULFATE IN WATER 40 MG/ML
2000 INJECTION, SOLUTION INTRAVENOUS ONCE
Status: COMPLETED | OUTPATIENT
Start: 2023-01-01 | End: 2023-01-01

## 2023-01-01 RX ORDER — NOREPINEPHRINE BIT/0.9 % NACL 16MG/250ML
1-100 INFUSION BOTTLE (ML) INTRAVENOUS CONTINUOUS
Status: DISCONTINUED | OUTPATIENT
Start: 2023-01-01 | End: 2023-03-18 | Stop reason: HOSPADM

## 2023-01-01 RX ORDER — GLYCOPYRROLATE 0.2 MG/ML
0.1 INJECTION INTRAMUSCULAR; INTRAVENOUS ONCE
Status: DISCONTINUED | OUTPATIENT
Start: 2023-01-01 | End: 2023-03-18 | Stop reason: HOSPADM

## 2023-01-01 RX ORDER — PROPOFOL 10 MG/ML
5-50 INJECTION, EMULSION INTRAVENOUS CONTINUOUS
Status: DISCONTINUED | OUTPATIENT
Start: 2023-01-01 | End: 2023-03-18 | Stop reason: HOSPADM

## 2023-01-01 RX ADMIN — DEXTROSE MONOHYDRATE 125 ML: 100 INJECTION, SOLUTION INTRAVENOUS at 19:20

## 2023-01-01 RX ADMIN — CHLORHEXIDINE GLUCONATE 0.12% ORAL RINSE 15 ML: 1.2 LIQUID ORAL at 09:06

## 2023-01-01 RX ADMIN — POLYVINYL ALCOHOL 1 DROP: 14 SOLUTION/ DROPS OPHTHALMIC at 00:14

## 2023-01-01 RX ADMIN — MINERAL OIL, WHITE PETROLATUM: .03; .94 OINTMENT OPHTHALMIC at 16:22

## 2023-01-01 RX ADMIN — SODIUM CHLORIDE: 9 INJECTION, SOLUTION INTRAVENOUS at 17:47

## 2023-01-01 RX ADMIN — PROPOFOL 20 MCG/KG/MIN: 10 INJECTION, EMULSION INTRAVENOUS at 03:57

## 2023-01-01 RX ADMIN — Medication 5 MCG/MIN: at 12:23

## 2023-01-01 RX ADMIN — GLUCAGON HYDROCHLORIDE 1 MG: KIT at 10:34

## 2023-01-01 RX ADMIN — POLYVINYL ALCOHOL 1 DROP: 14 SOLUTION/ DROPS OPHTHALMIC at 16:43

## 2023-01-01 RX ADMIN — FUROSEMIDE 40 MG: 10 INJECTION, SOLUTION INTRAMUSCULAR; INTRAVENOUS at 10:19

## 2023-01-01 RX ADMIN — CHLORHEXIDINE GLUCONATE 0.12% ORAL RINSE 15 ML: 1.2 LIQUID ORAL at 22:19

## 2023-01-01 RX ADMIN — SODIUM CHLORIDE, PRESERVATIVE FREE 10 ML: 5 INJECTION INTRAVENOUS at 10:21

## 2023-01-01 RX ADMIN — INSULIN HUMAN 10 UNITS: 100 INJECTION, SOLUTION PARENTERAL at 13:57

## 2023-01-01 RX ADMIN — FUROSEMIDE 40 MG: 10 INJECTION, SOLUTION INTRAMUSCULAR; INTRAVENOUS at 12:12

## 2023-01-01 RX ADMIN — MINERAL OIL, WHITE PETROLATUM: .03; .94 OINTMENT OPHTHALMIC at 04:11

## 2023-01-01 RX ADMIN — POLYVINYL ALCOHOL 1 DROP: 14 SOLUTION/ DROPS OPHTHALMIC at 09:00

## 2023-01-01 RX ADMIN — DEXTROSE AND SODIUM CHLORIDE: 5; 450 INJECTION, SOLUTION INTRAVENOUS at 07:47

## 2023-01-01 RX ADMIN — CEFTRIAXONE SODIUM 2000 MG: 2 INJECTION, POWDER, FOR SOLUTION INTRAMUSCULAR; INTRAVENOUS at 09:00

## 2023-01-01 RX ADMIN — DEXTROSE MONOHYDRATE 25 G: 25 INJECTION, SOLUTION INTRAVENOUS at 04:18

## 2023-01-01 RX ADMIN — POLYVINYL ALCOHOL 1 DROP: 14 SOLUTION/ DROPS OPHTHALMIC at 13:43

## 2023-01-01 RX ADMIN — SODIUM CHLORIDE 25 ML/HR: 3 INJECTION, SOLUTION INTRAVENOUS at 09:16

## 2023-01-01 RX ADMIN — TIOTROPIUM BROMIDE AND OLODATEROL 2 PUFF: 3.124; 2.736 SPRAY, METERED RESPIRATORY (INHALATION) at 08:03

## 2023-01-01 RX ADMIN — FAMOTIDINE 20 MG: 10 INJECTION, SOLUTION INTRAVENOUS at 09:06

## 2023-01-01 RX ADMIN — CALCIUM GLUCONATE 1000 MG: 10 INJECTION, SOLUTION INTRAVENOUS at 12:12

## 2023-01-01 RX ADMIN — DEXTROSE AND SODIUM CHLORIDE: 5; 450 INJECTION, SOLUTION INTRAVENOUS at 22:16

## 2023-01-01 RX ADMIN — CALCIUM GLUCONATE 1000 MG: 10 INJECTION, SOLUTION INTRAVENOUS at 04:10

## 2023-01-01 RX ADMIN — MAGNESIUM SULFATE HEPTAHYDRATE 2000 MG: 40 INJECTION, SOLUTION INTRAVENOUS at 10:29

## 2023-01-01 RX ADMIN — MINERAL OIL, WHITE PETROLATUM: .03; .94 OINTMENT OPHTHALMIC at 12:24

## 2023-01-01 RX ADMIN — ALBUTEROL SULFATE 10 MG: 2.5 SOLUTION RESPIRATORY (INHALATION) at 04:18

## 2023-01-01 RX ADMIN — SODIUM CHLORIDE 1000 ML: 9 INJECTION, SOLUTION INTRAVENOUS at 06:44

## 2023-01-01 RX ADMIN — Medication 5 MCG/MIN: at 00:25

## 2023-01-01 RX ADMIN — TIOTROPIUM BROMIDE AND OLODATEROL 2 PUFF: 3.124; 2.736 SPRAY, METERED RESPIRATORY (INHALATION) at 07:14

## 2023-01-01 RX ADMIN — PROPOFOL 10 MCG/KG/MIN: 10 INJECTION, EMULSION INTRAVENOUS at 09:11

## 2023-01-01 RX ADMIN — MINERAL OIL, WHITE PETROLATUM: .03; .94 OINTMENT OPHTHALMIC at 05:04

## 2023-01-01 RX ADMIN — DEXTROSE AND SODIUM CHLORIDE: 5; 450 INJECTION, SOLUTION INTRAVENOUS at 16:16

## 2023-01-01 RX ADMIN — CHLORHEXIDINE GLUCONATE 0.12% ORAL RINSE 15 ML: 1.2 LIQUID ORAL at 10:19

## 2023-01-01 RX ADMIN — MINERAL OIL, WHITE PETROLATUM: .03; .94 OINTMENT OPHTHALMIC at 22:17

## 2023-01-01 RX ADMIN — MORPHINE SULFATE 4 MG: 4 INJECTION, SOLUTION INTRAMUSCULAR; INTRAVENOUS at 20:01

## 2023-01-01 RX ADMIN — FAMOTIDINE 20 MG: 10 INJECTION, SOLUTION INTRAVENOUS at 10:20

## 2023-01-01 RX ADMIN — SODIUM CHLORIDE, PRESERVATIVE FREE 10 ML: 5 INJECTION INTRAVENOUS at 08:09

## 2023-01-01 RX ADMIN — SODIUM CHLORIDE, PRESERVATIVE FREE 10 ML: 5 INJECTION INTRAVENOUS at 09:02

## 2023-01-01 RX ADMIN — INSULIN HUMAN 10 UNITS: 100 INJECTION, SOLUTION PARENTERAL at 04:13

## 2023-01-01 RX ADMIN — ACETAMINOPHEN 650 MG: 325 TABLET ORAL at 12:15

## 2023-01-01 RX ADMIN — SODIUM CHLORIDE 6.6 UNITS/HR: 9 INJECTION, SOLUTION INTRAVENOUS at 06:52

## 2023-01-01 RX ADMIN — FUROSEMIDE 40 MG: 10 INJECTION, SOLUTION INTRAMUSCULAR; INTRAVENOUS at 13:58

## 2023-01-01 RX ADMIN — CEFTRIAXONE SODIUM 2000 MG: 2 INJECTION, POWDER, FOR SOLUTION INTRAMUSCULAR; INTRAVENOUS at 09:35

## 2023-01-01 RX ADMIN — POLYVINYL ALCOHOL 1 DROP: 14 SOLUTION/ DROPS OPHTHALMIC at 08:07

## 2023-01-01 RX ADMIN — ALBUTEROL SULFATE 2.5 MG: 2.5 SOLUTION RESPIRATORY (INHALATION) at 15:33

## 2023-01-01 ASSESSMENT — PULMONARY FUNCTION TESTS
PIF_VALUE: 28
PIF_VALUE: 27
PIF_VALUE: 26
PIF_VALUE: 28
PIF_VALUE: 28
PIF_VALUE: 33
PIF_VALUE: 28
PIF_VALUE: 29
PIF_VALUE: 28
PIF_VALUE: 28

## 2023-01-12 DIAGNOSIS — N18.32 CHRONIC KIDNEY DISEASE, STAGE 3B (HCC): Primary | ICD-10-CM

## 2023-01-12 RX ORDER — METOLAZONE 2.5 MG/1
2.5 TABLET ORAL
Qty: 30 TABLET | Refills: 1 | Status: SHIPPED | OUTPATIENT
Start: 2023-01-12

## 2023-01-12 NOTE — TELEPHONE ENCOUNTER
Pt called and wants to know if you will fill his metolazone twice weekly? He states the metolazone just keeps his swelling under control.

## 2023-01-13 NOTE — TELEPHONE ENCOUNTER
Left message informing pt to get labs done next. Asked for a call back to confirm where to fax the labs.     Lab order pending

## 2023-01-16 ENCOUNTER — NURSE ONLY (OUTPATIENT)
Dept: LAB | Age: 68
End: 2023-01-16

## 2023-01-16 DIAGNOSIS — E87.5 HYPERKALEMIA: ICD-10-CM

## 2023-01-16 DIAGNOSIS — I12.9 HYPERTENSIVE RENAL DISEASE, STAGE 1 THROUGH STAGE 4 OR UNSPECIFIED CHRONIC KIDNEY DISEASE: ICD-10-CM

## 2023-01-16 DIAGNOSIS — N17.9 AKI (ACUTE KIDNEY INJURY) (HCC): ICD-10-CM

## 2023-01-16 DIAGNOSIS — N18.32 CHRONIC KIDNEY DISEASE, STAGE 3B (HCC): ICD-10-CM

## 2023-01-16 DIAGNOSIS — E11.21 DIABETIC NEPHROPATHY ASSOCIATED WITH TYPE 2 DIABETES MELLITUS (HCC): ICD-10-CM

## 2023-01-16 LAB
ANION GAP SERPL CALCULATED.3IONS-SCNC: 11 MEQ/L (ref 8–16)
BUN BLDV-MCNC: 41 MG/DL (ref 7–22)
CALCIUM SERPL-MCNC: 9.2 MG/DL (ref 8.5–10.5)
CHLORIDE BLD-SCNC: 98 MEQ/L (ref 98–111)
CO2: 31 MEQ/L (ref 23–33)
CREAT SERPL-MCNC: 1.6 MG/DL (ref 0.4–1.2)
GFR SERPL CREATININE-BSD FRML MDRD: 46 ML/MIN/1.73M2
GLUCOSE BLD-MCNC: 248 MG/DL (ref 70–108)
HCT VFR BLD CALC: 55.3 % (ref 42–52)
HEMOGLOBIN: 16.1 GM/DL (ref 14–18)
POTASSIUM SERPL-SCNC: 4.6 MEQ/L (ref 3.5–5.2)
SODIUM BLD-SCNC: 140 MEQ/L (ref 135–145)

## 2023-02-07 NOTE — TELEPHONE ENCOUNTER
Next appt 4/24/23    Script pending
This was already sent to [facility] as he asked 3 weeks ago with 3 months supply and one refill.
show

## 2023-03-16 PROBLEM — I46.9 CARDIAC ARREST (HCC): Status: ACTIVE | Noted: 2023-01-01

## 2023-03-16 NOTE — PROCEDURES
Date: 3/16/2023  Referring physician: IVANIA Crenshaw - CNP    Indication  Patient aged 69 y with encephalopathy. EEG done to assess for epileptiform activity. Introduction  This routine 110-minute EEG was recorded using the stiQRd one band system. Automated seizure detection algorithms were applied. Description  The background consistent of diffuse none reactive polymorphic delta and theta slowing with brief periods of attenuation. . No consistent focal slowing or interhemispheric asymmetry was noted. Stage I and stage II sleep were not observed. There were no interictal epileptiform discharges or electrographic seizures. Activations  Hyperventilation was not performed. Intermittent photic stimulation was not performed    Impression  Abnormal awake EEG. The slowing mentioned above suggests moderate non specific encephalopathy. No epileptiform discharges were identified. Please note the absence of such activity on this record cannot conclusively rule out an epileptic disorder. If such is still clinically suspected, a repeat study with sleep deprivation and/or prolonged sampling may be helpful. Please note this EEG was meant to screen for emergent condition and is prone to artifact and with some limitations. The interpretation of this EEG result should be taken only with clinical correlation. Ideally regular EEG with full leads should be considered when possible. Maryam Rogers MD  Epilepsy Board Certified. Neurology Board Certified.     Electronically Signed

## 2023-03-16 NOTE — PROGRESS NOTES
[facility] Laboratory Technician Worksheet      EEG Date: 3/16/2023    Name: Son Sherman   : 1955   Age: 69 y.o. SEX: male    ROOM: 14 MRN: [id number]           CSN: [id number]      Ordering Provider: Aishwarya Jimenez EEG Number: [id number] Time of Test:  6174    Hand: UNKNOWN   Sedation: yes - PROPOFOL    H.V. Done: No  VENT, AGE PROTOCOL  Photic: No    Sleep: No  Drowsy: No   Sleep Deprived: No    Seizures observed: R FOOT JERKS NOTED    Mentality: SEDATED      Clinical History:S/P CARDIAC ARREST, CKD,  CT  Impression       No acute intracranial abnormality. Old left frontal and left parietal lobe infarcts. Atrophy and chronic microvascular ischemia. Pansinus disease.        Past Medical History:       Diagnosis Date    Asthma     WHEN HE WAS A CHILD    CAD (coronary artery disease)     COPD (chronic obstructive pulmonary disease) ([facility])     Diabetes mellitus ([facility])     type II    Hyperlipidemia     Hypertension     Pneumonia     Unspecified cerebral artery occlusion with cerebral infarction     Venous stasis ulcer of left lower leg with edema of left lower leg ([facility]) 2020       Scheduled Meds:   chlorhexidine  15 mL Mouth/Throat BID    sodium chloride flush  5-40 mL IntraVENous 2 times per day    polyvinyl alcohol  1 drop Both Eyes Q4H    Or    artificial tears   Both Eyes Q4H    tiotropium-olodaterol  2 puff Inhalation Daily    famotidine (PEPCID) injection  20 mg IntraVENous Daily    cefTRIAXone (ROCEPHIN) IV  2,000 mg IntraVENous Q24H     Continuous Infusions:   sodium chloride      propofol 20 mcg/kg/min (23 1000)    insulin 14.1 Units/hr (23 1000)    dextrose       PRN Meds:.sodium chloride flush, sodium chloride, ondansetron **OR** ondansetron, polyethylene glycol, acetaminophen **OR** acetaminophen, insulin regular, albuterol, glucose, dextrose bolus **OR** dextrose bolus, glucagon (rDNA), dextrose    Technician: Angel Hayes 3/16/2023

## 2023-03-16 NOTE — PLAN OF CARE
Problem: Respiratory - Adult  Goal: Achieves optimal ventilation and oxygenation  Outcome: Progressing  Flowsheets (Taken 3/16/2023 0809)  Achieves optimal ventilation and oxygenation:   Position to facilitate oxygenation and minimize respiratory effort   Oxygen supplementation based on oxygen saturation or arterial blood gases

## 2023-03-16 NOTE — PROGRESS NOTES
Comprehensive Nutrition Assessment    Type and Reason for Visit:  Initial (TF recommendations - intubated)    Nutrition Recommendations/Plan:   Consider initiate TF if medically appropriate - recommend Nepro carb steady at 10 ml/hr. Suggest increase TF by 10 ml every 6 hours, as tolerated, to goal rate of 30 ml/hr. Will monitor Diprivan rate vs. Ability to adjust TF. Consider add protein modular as renal function allows. Additional free water flush per MD.  Will monitor need for additional nutrition interventions. Malnutrition Assessment:  Malnutrition Status: At risk for malnutrition (Comment) (03/16/23 0463)    Context:  Acute Illness     Findings of the 6 clinical characteristics of malnutrition:  Energy Intake:  Mild decrease in energy intake (Comment) (NPO since 3/15)  Weight Loss:  No significant weight loss     Body Fat Loss:  No significant body fat loss     Muscle Mass Loss:  No significant muscle mass loss    Fluid Accumulation:  Unable to assess     Strength:  Not Performed    Nutrition Assessment:     Pt. nutritionally compromised AEB NPO status. At risk for further nutrition compromise r/t intubation; s/p cardiac arrest and underlying medical condition (hx CAD, CKD, COPD, DM, CVA, HLD, HTN). Nutrition Related Findings:      Wound Type: Skin Tears (elbow)     Pt. Report/Treatments/Miscellaneous: pt. Intubated 3/15 s/p cardiac arrest; per RN - not following commands; per MD - just wanting TF recommendations today; spoke w/ pt's family present in room - states pt. Typically eats well pta; CT abdomen = adrenal nodule, renal stone  GI Status: no BM since admission  Pertinent Labs: 3/16: Glucose 273, BUN 63, Cr 2.7, Potassium 5.7, NH3 73; elevated LFTs; MAP 95; HgbA1c 8.6%  Pertinent Meds: ATB, Pepcid, Insulin, Glycolax, Zofran, Diprivan      Current Nutrition Intake & Therapies:    Average Meal Intake: NPO     Diet NPO  Additional Calorie Sources:  Diprivan at 14.5 ml/hr providing pt. with 383 kcals from IV lipid emulsion/24 hours    Anthropometric Measures:  Height: 5' 9\" (175.3 cm)  Ideal Body Weight (IBW): 160 lbs (73 kg)    Admission Body Weight: 261 lb 0.4 oz (118.4 kg) (3/16 +1, +3 edema)  Current Body Weight: 261 lb 0.4 oz (118.4 kg) (3/16 +1, +3 edema),      Current BMI (kg/m2): 38.5  Usual Body Weight:  (per EMR: 6/30/20: 259# 3 oz, 7/31/22: 248# 3 oz, 9/6/22: 240# 13 oz)                       BMI Categories: Obese Class 2 (BMI 35.0 -39.9)    Estimated Daily Nutrient Needs:  Energy Requirements Based On: Kcal/kg  Weight Used for Energy Requirements: Other (Comment) (118)  Energy (kcal/day): 9193-7926 kcals (13-14)  Weight Used for Protein Requirements: Ideal (73)  Protein (g/day): ~73 grams (1/kgm) - CKD, increase if renal function allows  Method Used for Fluid Requirements: Other (Comment)  Fluid (ml/day): per MD    Nutrition Diagnosis:   Inadequate oral intake related to impaired respiratory function as evidenced by NPO or clear liquid status due to medical condition, intubation    Nutrition Interventions:   Food and/or Nutrient Delivery: Continue NPO  Nutrition Education/Counseling: No recommendation at this time  Coordination of Nutrition Care: Continue to monitor while inpatient       Goals:     Goals: Initiate nutrition support, by next RD assessment       Nutrition Monitoring and Evaluation:      Food/Nutrient Intake Outcomes: Diet Advancement/Tolerance, Enteral Nutrition Intake/Tolerance  Physical Signs/Symptoms Outcomes: Biochemical Data, Chewing or Swallowing, GI Status, Fluid Status or Edema, Hemodynamic Status, Nutrition Focused Physical Findings, Skin, Weight    Discharge Planning:     Too soon to determine     Lamberto Kline RD, LD  Contact: [phone]

## 2023-03-16 NOTE — H&P
CRITICAL CARE PROGRESS NOTE      Patient:  Sahra Edwards    Unit/Bed:4D-14/014-A  YOB: 1955  MRN: [id number]   PCP: IVANIA Tatum CNP  Date of Admission: (Not on file)  Chief Complaint:- cardiac arrest    Assessment and Plan:    Acute respiratory failure hypoxia and hypercapnia: Patient was orally intubated 3/16/2023 after EMS had found collapsed and absent pulse. Patient was admitted to [facility] ICU on 3/16/2023 placed on PCV mode of ventilation. Continue with lung protection strategies targeting peak pressure 35 or less and plateau of 30 and less. Respiratory culture is pending. Cardiac Arrest:  Information from EMS reported presenting rhythm Asystole. It was reported that patient was visiting friends and had collapsed, no bystander CPR was initiated. ROSC was obtained after EMS arrival approximately 5 minutes after CPR/EPI/HCO3 and Intubation had occurred. Unfortunately after arrival to [facility] ER PEA Arrest-with ROSC obtained after 2 minutes of CPR/EPI given. EKG no STEMI. CTH revealed no acute intracranial abnormality. No role for TTM. Focus on fever prevention. Maintain MAP >65. Cerebelli will be applied for EEG evaluation. Severe Community Acquired Pneumonia: likely aspiration. 3/16/2023 CT Chest-revealed right lower lobe consolidation and right upper lobe pulmonary opacities. [facility] No history of MRSA or prior respiratory pseudomonas. History of Severe COPD. Rocephin was added, awaiting on Respiratory culture. Acute hyperkalemia:  no EKG changes noted at time of admission. Insulin/D50W/Calcium given. It is likely secondary to insulin deficiency. Acute Hyperglycemia:  Patient does not meet criteria for DKA. (No ketouria, no metabolic acidosis). He did receive Solumedrol at [facility] facility. History of poorly controlled DMT2. Insulin gtt will be started for initial management.  Home medications will need to be reviewed with family. Elevated troponin:  in the setting of cardiac arrest. EKG no STEMI, history of ischemic cardiomyopathy, ASHD s/p CABG, CAD s/p stenting. Positive for CKD. CTH no acute intracranial abnormality. Will repeat if continues to rise may need to consider Heparin therapy. Acute Hyperammonemia:  no history of such. 3/16/2023 CT ABD/PELVIS-liver was normal is size and shape. Patient did receive Steroids at outlying facility prior to arrival. Repeat Ammonia level if elevated this require treatment. Elevated liver enzymes:  AST>ALT, no jaundice. Concerns of acute liver injury, s/p cardiac arrest with CPR. 3/16/2023 CT ABD/PELVIS liver was normal. Acetaminophen and Salicylate levels were normal. ETOH level is pending. Urine drug screen positive for Cannabinoids and Fentanyl. Home medications are pending for review. Patient arrived hypertensive with no vasopressors needed for blood pressure support. Acute hyperphosphatemia:  multifactorial likely secondary to insulin deficiency, and respiratory acidosis. CPK is pending. CKD-GFR is within baseline. Calcium level is normal. 3/16/2023 Patient did receive IV Calcium with hyperkalemia management. Repeat level is pending. CKD stage 3: (baseline Crea 1.3-2.2) established patient of Dr. Moody/Nephrology. Dose medications to GFR, no NSAIDS to be given. Severe COPD:  history, PFT 2022 FEV1=41%. ABG revealed Respiratory acidosis. Awaiting for home medications for review. HFmrEF:  history, ECHO 2022 LVEF 40-45%, repeat ECHO is pending. Awaiting for home medications for review. No diuretics needed at time of admission or pressors, monitor. Cannabinoid, positive urine drug screen:   Found at time of admission, monitor. History of such.      INITIAL H AND P AND ICU COURSE:  Kaleb Cason is a 69year old gentlemen admitted to [facility] ICU 3/16/2023 s/p Cardiac Arrest.     Patient has a past medical history former smoker, PFT 2022 FEV1 41%,  Severe COPD, Bronchial Asthma, ASHD s/p CABG-, s/p PCI , Essential HPTN, Fixed ASD repair-Amplatzer device , Ischemic cardiomyopathy, HRmrEF-ECHO  LVEF 40-45% mild TR, CVA s/p Right Carotid Endarterectomy-, Left Carotid Endarterectomy-, DMT2, Dyslipidemia, CKD 3B (baseline Crea 1.3-2.2) , PAD s/p Bilateral Common Iliac Artery Angioplasty and Stenting-, Morbid Obesity    Alfonso presented to [facility] ER via [facility] as a transfer from [facility]. Patient was visiting friends and \"collapsed\" EMS was called and initial rhythm Asystole. [facility] reported that ROSC was obtained after 5 minutes and CPR/EPI and HCO3 given. He was orally intubated prior to transfer to the ER. On arrival to [facility] ER Positive for PEA Arrest with ROSC after 2 minutes of CPR/EPI given. Severe episodes of Bradycardia with HR 40's reported. Patient was hypertensive and did receive Labetalol 10 milligrams IVP X1. Solumedrol 125IVP/Levaquin 750 given prior to transfer. [facility] enroute did given Fentanyl IVP X1 and Duoneb Aerosol X1. Patient arrived to [facility] intubated and bagging fairly easily. Monitor Sinus Tachycardia. HR  and -160. No purposeful movement noted with GCS=3. No lab results from outlying facility arrived with transfer packet. Past Medical History:  see HPI. Family History: Mother , Father -DMT2. Social History:  former smoker, Positive for [employer] use, and Marijuana use. ROS   GENERAL: No fever,   SKN: No lesions or rashes, positive for discoloration bilateral lower extremities. HEAD: No injury reported. EYES: Positive for bilateral scleral edema, sclera is reddened. No drainage. EARS: No drainage  NOSE/THROAT: No rhinorrhea or pharyngitis, no nasal drainage  NECK: No lumps or unusual neck stiffness  PULMONARY: Positive for oral intubation, positive for thick tan secretions.   CARDIAC: Positive for tachycardia and hypertension  GI: No history melena or hematochezia, no diarrhea or constipation  PERIPHERAL VASCULAR: No intermittent claudication or unusual leg cramps  MUSCULOSKELETAL: Occasional arthralgias, myalgias  NEUROLOGICAL: Positive for unresponsive, GCS 3,  HEMATOLOGIC:  No unusual bruising or bleeding  PSYCH: No homicidal or suicidal ideations    Scheduled Meds:  Continuous Infusions:    PHYSICAL EXAMINATION:  T: 97.1.  P: 92. RR: 20. B/P: 194/102.  FiO2: 100. O2 Sat: 100.  I/O: Pending  There is no height or weight on file to calculate BMI.   GCS:   3  PC: 22/12: TV: 480-496: RRTotal: 24: Ti:1 sec: ETCO2: 72  General:   pale, warm, dry  HEENT:  normocephalic and atraumatic.  Positive for bilateral scleral edema.  Pupils are pinpoint and nonreactive.  Very poor cough with suctioning noted.  Doll's eyes are absent.  Disconjugate gauge noted.    Neck: supple.  No Thyromegaly.  Lungs: clear to auscultation, diminished on right side..  No retractions.  Orally intubated and connected to PCV mode of ventilation.  Cardiac: RRR-tachycardic.  No JVD.  Abdomen: soft.  Nontender, large bowel sounds are hypoactive. NG-LIWS.  Extremities:  No clubbing, cyanosis, or edema x 4.  Positive for bilateral lower leg pitting edema, thick psoriasis noted.  Discoloration purpleish mid calf down bilaterally.  Capillary refill 68 seconds.  Pulses per Doppler. Positive of IO via the left tibia.  Vasculature: capillary refill >3 seconds.  Skin:  warm and dry.  Psych: Intubated and sedated affect appropriate  Lymph:  No supraclavicular adenopathy.  Neurologic: No purposeful movement.  No withdraw from pain with painful stimuli.  Random twitching noted bilateral lower extremities.    Data: (All radiographs, tracings, PFTs, and imaging are personally viewed and interpreted unless otherwise noted).   EKG at [facility] facility 3/16/2023 at 00 43 very wavy baseline-sinus tachycardia with left axis deviation and right bundle branch block.  3/16/2023 00 35 chest x-ray large right upper lung  infiltrate. Subtle widening. Bones are intact. Normal upper abdomen. Labs from outlying facility-limited  ABG pH 6.89 PO2 151  White count 16.75  Troponin 275  P BNP 14,311  Glucose 465  BUN 56 creatinine is 2.55      [facility] lab data at time of admission  3/16/2023 04 18 sodium 138 potassium 6.1 chlorides 98 CO2 26  BUN 55 creatinine is 2.2  Magnesium is 2.9  Glucose 467  Calcium is 8.7  Phosphorus 9.1  Ammonia 126  Troponin 0.116  Albumin 3.1 alk phos is 493 ALT is 516   Beta hydroxybutyrate 3.36  TSH 12.19  Urine toxicology positive for cannabinoids and fentanyl  Acetaminophen level less than 5.0  Salicylate level less than 0.3  WBC 23.4 hemoglobin 16.5 hematocrit 59.9 platelet count 609  Influenza A-negative influenza B-negative  COVID-19 negative  Urine nicko small moderate bilirubin negative ketones large amount of blood greater than 300 protein greater than 1000 glucose 25-50 WBCs no bacteria  3/16/2023 CTH no acute intracranial abnormality. Old left frontal and left parietal lobe infarcts. Atrophy and chronic microvascular ischemia. Pan sinus disease. 3/16/2023 CT Chest-right lower lobe consolidation and right upper lobe pulmonary opacities suspicious for pneumonia. Mildly dilated ascending aorta measuring 3.6 cm. Endotracheal tube is 2.7 cm above the lexi. There are reticular and groundglass opacities in the right upper lobe. There is right lower lobe consolidation. No pleural effusions or pneumothorax is seen. 3/16/2023 CT ABD/PELVIS-liver spleen pancreas and right adrenal glands are normal in size and shape. There are punctate calcifications within the spleen suggesting old granulomatous disease. There is 2.7 x 2.5 cm left adrenal node. The gallbladder is seen and no gallstones are detected. Both kidneys are normal in size there is a 5-6 mm left renal stone nonobstructive. There is no hydronephrosis. There is gas within the urinary bladder.   No evidence for bowel obstruction. A normal appendix is seen. There is mild fusiform aneurysmal dilation of the infrarenal abdominal aorta measuring 3.6 cm. There are dense atherosclerotic calcifications. There is a right common iliac artery stent. There is a small left inguinal hernia containing fat. There are densities in the subcutaneous fat of the anterior lower abdomen bilaterally. EKG normal sinus rhythm with first-degree AV block heart rate 89  QTc 462 left axis deviation. There is R wave progression across the precordial leads. No acute ST elevation noted. ECHO 7/14/2022 LVEF 40 to 45%. The right ventricle size was normal with normal systolic function and wall thickness. Mild tricuspid regurg visualized. Cardiac Telemetry Sinus Tachycardia. Seen with multidisciplinary ICU team yes. Meets Continued ICU Level Care Criteria:    [x] Yes   [] No - Transfer Planned to listed location:  [] HOSPITALIST CONTACTED-      Case and plan discussed with  [place]         Electronically signed by IVANIA Silva - CNP  CRITICAL CARE SPECIALIST

## 2023-03-16 NOTE — FLOWSHEET NOTE
Patient arrived to unit from [facility] via [facility] [employer]. Patient transferred to ICU bed and placed on continuous ICU bedside monitor. Patient admitted for Cardiac arrest ([facility] [address] [I46.9]. Vitals obtained. See flowsheets. Patient's IV access includes 20 G Right FA, 20 G right AC, and 20 g Left. Current infusions and rates of infusion include n/a. Assessment completed by CHRIS Luong. Two nurse skin assessment completed by CHRIS Rios RN and [address] [place] [address]. See flowsheets for assessment details. Policies and procedures of ICU unable to be explained to patient at this time. Family member(s)/representative(s) present at time of admission include n/a. Patient rights explained to family member(s)/representatives and patient, as able. Patient/patient's family member(s)/representative(s) N/A to have physician notified of their admission. All questions posed by patient's family member(s)/representative(s) and patient answered at this time.

## 2023-03-16 NOTE — SIGNIFICANT EVENT
Dory Mabry presented to [facility] s/p Cardiac Arrest with presenting rhythm of Asystole. ROSC obtained after approximately 5 minutes with CPR/EPI and HCO3. On arrival to [facility] ER positive for PEA Arrest with ROSC after approximately 2 minutes with CPR and EPI. CTH reviewed no acute intracranial abnormality. EKG no STEMI    No role for TTM.

## 2023-03-16 NOTE — PLAN OF CARE
Problem: Respiratory - Adult  Goal: Achieves optimal ventilation and oxygenation  3/16/2023 1551 by Lizzy Poole RCP  Outcome: Not Progressing      Achieves optimal ventilation and oxygenation:   Position to facilitate oxygenation and minimize respiratory effort   Oxygen supplementation based on oxygen saturation or arterial blood gases

## 2023-03-16 NOTE — PLAN OF CARE
Problem: Chronic Conditions and Co-morbidities  Goal: Patient's chronic conditions and co-morbidity symptoms are monitored and maintained or improved  Outcome: Progressing  Flowsheets (Taken 3/16/2023 0318)  Care Plan - Patient's Chronic Conditions and Co-Morbidity Symptoms are Monitored and Maintained or Improved:   Monitor and assess patient's chronic conditions and comorbid symptoms for stability, deterioration, or improvement   Collaborate with multidisciplinary team to address chronic and comorbid conditions and prevent exacerbation or deterioration   Update acute care plan with appropriate goals if chronic or comorbid symptoms are exacerbated and prevent overall improvement and discharge     Problem: Discharge Planning  Goal: Discharge to home or other facility with appropriate resources  Outcome: Progressing  Flowsheets (Taken 3/16/2023 0318)  Discharge to home or other facility with appropriate resources:   Identify barriers to discharge with patient and caregiver   Arrange for needed discharge resources and transportation as appropriate   Identify discharge learning needs (meds, wound care, etc)     Problem: Pain  Goal: Verbalizes/displays adequate comfort level or baseline comfort level  Outcome: Progressing

## 2023-03-16 NOTE — PROGRESS NOTES
Pharmacy Renal Adjustment    Sahra Edwards is a 69 y.o. male. Pharmacy to renally adjusted the following medications:  Pepcid per P&T approved policy    Height:   Ht Readings from Last 1 Encounters:   03/16/23 5' 9\" (1.753 m)     Weight:  Wt Readings from Last 1 Encounters:   03/16/23 261 lb 0.4 oz (118.4 kg)     Recent Labs     03/16/23  0418   BUN 55*   CREATININE 2.2*     Estimated Creatinine Clearance: 40 mL/min (A) (based on SCr of 2.2 mg/dL (H)). Calculated CrCl:    Assessment:  LAYA    Plan:   Decrease Pepcid from twice daily to daily.     JIM Barrientos [facility] [place] PharmD  3/16/2023   6:09 AM

## 2023-03-16 NOTE — CARE COORDINATION
Case Management Assessment  Initial Evaluation    Date/Time of Evaluation: 3/16/2023 11:56 AM  Assessment Completed by: Hernando Tang RN    If patient is discharged prior to next notation, then this note serves as note for discharge by case management. Patient Name: Sheyla Duffy                   YOB: 1955  Diagnosis: Cardiac arrest [facility]) [I46.9]                   Date / Time: 3/16/2023  3:11 AM  Location: [address] [place] [address]     Patient Admission Status: Inpatient   Readmission Risk Low 0-14, Mod 15-19), High > 20: Readmission Risk Score: 15.9    Current PCP: IVANIA Michel CNP  PCP verified by CM? Yes    Chart Reviewed: Yes      History Provided by: Child/Family (Son Zion Henley)  Patient Orientation: Sedated (on vent)    Patient Cognition: Other (see comment) (Sedated on vent)    Hospitalization in the last 30 days (Readmission):  No    If yes, Readmission Assessment in CM Navigator will be completed. Advance Directives:      Code Status: Full Code   Patient's Primary Decision Maker is: Named in Scanned ACP Document      Discharge Planning:    Patient lives with: Spouse/Significant Other Type of Home: House  Primary Care Giver: Spouse  Patient Support Systems include: Spouse/Significant Other, Children   Current Financial resources: Medicare  Current community resources: None  Current services prior to admission: Durable Medical Equipment            Current DME: Shower Chair, Other (Comment) ([employer])            Type of Home Care services:  None    ADLS  Prior functional level: Assistance with the following:, Bathing, Dressing, Toileting, Mobility (Wife does cooking and cleaning)  Current functional level: Other (see comment) (MAXI; sedated on vent post-code)    Family can provide assistance at DC: Yes  Would you like Case Management to discuss the discharge plan with any other family members/significant others, and if so, who?  Yes (Patient's wife)  Plans to Return to Present Housing: Unknown at present  Other Identified Issues/Barriers to RETURNING to current housing: MAXI; sedated on vent since code  Potential Assistance needed at discharge: Other (Comment) (Too soon to tell - pending outcome since code)            Potential DME:    Patient expects to discharge to: Unknown  Plan for transportation at discharge: Other (see comment) (Dependent on patient condition)    Financial    Payor: MEDICARE / Plan: MEDICARE PART A AND B / Product Type: *No Product type* /     Does insurance require precert for SNF: No    Potential assistance Purchasing Medications: No  Meds-to-Beds request: Yes      [facility] [place], [place] - [address]  [address] [place] [id number]  Phone: [phone] Fax: [phone]      Notes:    Factors facilitating achievement of predicted outcomes: Family support    Barriers to discharge: Medical complications, Medication managment, and on vent    Additional Case Management Notes: Presented to [address] [facility]. Patient was visiting friends and 'collapsed'. CPR initiated when EMS arrived and found patient in asystole. ROSC attained 5 minutes after CPR initiated. Intubated by EMS. At [facility], patient again coded - PEA- and ROSC attained after 2 minutes. Hypertensive, labetalol given. Transferred to [facility] ICU by Kourtney Richard. No purposeful movement noted post-code. Echo ordered. Remains on vent w/ETT on PCV, peep 10, FIO2 70%, sats 95%. Afebrile. NSR. Pupils pinpoint, round, nonreactive. Unable to follow commands; no movement to painful stim x4. Weak cough and gag noted. Respiratory culture +Moraxella catarrhalis. Telemetry, n/v checks, ana maria, SCDs. Insulin drip, diprivan @ 20 mcg/kg/min, IV rocephin, peridex, IV pepcid, IV levaquin, inhaler. Received 2g mag sulfate x1, 1g Ca+ gluconate, 25g D50 x1, 40 mg IV lasix x1, 10 units IV insulin x1, and 1mg glucagon x1 today.  To receive 40 mg IV lasix x1 and 1g Ca+ gluconate today. Received 30 ml/kg fld bolus today. Na+ 147, K+ 6.3 - now 5.9, BUN 61 - now 63, Creat 2.5, Ical 0.94, Mg 2.9, LA 4.6 - now 2, Phos 9.1 - now 8.1, procal 0.23, , trop 0.116 - then 0.164, alb 3.1, alk phos 493, alt 516, ammonia 126 - now 73, ast 784, beta-hydroxybutyrate 3.36, HgbA1C 8.6, TSH 12.19, wbc 23.4, INR 1.22. Urine tox +cannabinoid and fentanyl. Rapid flu & COVID 19 were negative. +Myoglobin urine. Urine sent for culture. Procedure:   3/15 Pre-hospital Code Blue; Found in asystole, CPR initiated by EMS w/ROSC in 5 minutes after initiation of CPR  3/15 Intubated by EMS  3/15 Code Blue at [facility] 10 w/ROSC in 2 minutes  3/15 CT Head: No acute findings; Old left frontal and left parietal lobe infarcts  3/15 CT Chest: Right lower lobe consolidation and right upper lobe pulmonary opacities suspicious for pneumonia.; Mildly dilated ascending aorta measuring 3.6 cm; Atherosclerotic calcifications including coronary artery calcifications  3/15 CT Abd/pelvis: 5-6 mm nonobstructive left renal stone. 2.7 x 2.5 cm left adrenal nodule, incompletely characterized and slightly increased in size. The differential diagnosis includes adenoma, metastases or other; Chemical shift MRI may be performed for further evaluation; Mild fusiform aneurysmal dilatation of the infrarenal abdominal aorta measuring 3 cm; Severe atherosclerosis. Small left inguinal hernia containing fat. Densities in the subcutaneous fat of the anterior lower abdomen bilaterally. This may represent contusion or edema. 3/15 CXR:   1. Tube positions as described above. 2. Bilateral interstitial edema. 2. Right upper lobe opacity, could be alveolar edema, or infiltrate       The Plan for Transition of Care is related to the following treatment goals of Cardiac arrest [facility]) [I46.9]    Patient Goals/Plan/Treatment Preferences: From home with wife. Required assistance with ADLs. Plan pending clinical course. Transportation/Food Security/Housekeeping Addressed: No issues identified.      Janay Brown RN  Case Management Department'

## 2023-03-16 NOTE — PROGRESS NOTES
Patient Weaning Progress    The patient's vent settings was able to be weaned this shift. Ventilator settings that were weaned              [] Mode   [] Pressure support weaned   [x] Fio2 weaned   [x] Peep weaned    SBT Readiness form filled out? [x]Yes        []No    Spontaneous weaning trial  was not attempted. due to defined parameters for SBT (spontaneous breathing trial) not being met. The patient was not able to tolerate SBT.   s     Evac tube was not  hooked up with continuous low suction(20-30mmHg)      Cuff was  deflated to determine cuff leak. A leak  was detected. Cuff leak was 20%         Family mutually agreed on goals.

## 2023-03-16 NOTE — PROCEDURES
Date: 3/16/2023  Referring physician: Tito Ramirez MD    Indication  Patient aged 69 y with encephalopathy. EEG done to assess for epileptiform activity. Introduction  This  4 hrs  vEEG was recorded using the International 10-20 System on a orangutrans workstation at 256 samples/s. Automated spike and seizure detection algorithms were applied. Description  The background consistent of burst suppression pattern. No consistent focal slowing or interhemispheric asymmetry was noted. Stage I and stage II sleep were observed. Activations  Hyperventilation was not performed. Intermittent photic stimulation was not performed. Impression  Abnormal awake EEG. The slowing mentioned above suggests severe non specific encephalopathy. Patient had random leg myoclonus that correlate with burst of delta activity suggesting cortical myoclonus. It was hard to decide if these myoclonus were continuous to suggest ongoing myoclonus status, thus recommend clinical correlation. Recommend continuous vEEG to evaluate for ongoing myoclonus status epilepticus. Lyndsey King MD  Epilepsy Board Certified. Neurology Board Certified.     Electronically Signed

## 2023-03-16 NOTE — PROGRESS NOTES
Pt was unresponsive but was anointed.     03/16/23 1525   Encounter Summary   Encounter Overview/Reason  Initial Encounter   Service Provided For: Patient   Referral/Consult From: Rounding   Last Encounter  03/16/23  (Anointed  NR)   Complexity of Encounter Low   Begin Time 1436   End Time  1440   Total Time Calculated 4 min   Spiritual/Emotional needs   Type Spiritual Support   Rituals, Rites and Sacraments   Type Anointing

## 2023-03-17 NOTE — PLAN OF CARE
Problem: Respiratory - Adult  Goal: Achieves optimal ventilation and oxygenation  3/17/2023 1840 by Kady Monaco RCP  Outcome: Progressing  Note:                                              Patient Weaning Progress    The patient's vent settings was not able to be weaned this shift. Ventilator settings that were weaned              [] Mode   [] Pressure support weaned   [] Fio2 weaned   [] Peep weaned    SBT Readiness form filled out? []Yes        []No    Spontaneous weaning trial  was not attempted. due to defined parameters for SBT (spontaneous breathing trial) not being met. The patient was not able to tolerate SBT. Pt will be a terminal wean probably tomorrow. Cuff was not  deflated to determine cuff leak. Unable to get agreement for goals because no family is present and patient cannot respond.

## 2023-03-17 NOTE — PROGRESS NOTES
CRITICAL CARE PROGRESS NOTE      Patient:  [facility]    Unit/Bed:4D-14/014-A  YOB: 1955  MRN: [id number]   PCP: IVANIA Chu CNP  Date of Admission: 3/16/2023  Chief Complaint:- Cardiac Arrest    Assessment and Plan:    Acute respiratory failure hypoxia and hypercapnia: Patient intubated on 3/16/2023 after being found in cardiac arrest.  Patient admitted to ICU on 3/16/2023. Patient continued on ventilator requiring elevated pressure levels. Cardiac Arrest: Patient was found in asystole and ROSC was obtained after approximately 5 minutes of CPR, epi, bicarbonate, and intubation. Upon arrival to outside ER patient went into a PEA arrest but ROSC was obtained after 2 minutes of CPR and epi. No STEMI on ECG and CT head showed no acute intracranial abnormality. No role for targeted temperature management due to initial rhythm presenting as asystole. Focus on normothermic treatment and fever prevention. 4-hour ECG was obtained showing random leg myoclonus but no seizure activity. 24-hour CT head showing diffuse cerebral edema with mass effect and mild hydrocephalus. Placed on 3% hypertonic saline with sodium goal of 145 155. Discussed findings with family and discussed prognosis as well as goals of care. Family stated that the patient would not build to be kept on a ventilator. They want patient's CODE STATUS to be changed to DNR CC and stated that they plan to have the patient terminally extubated tomorrow when family is present. Severe CAP: Suspect 2/2 aspiration. 3/16/2023 CT chest revealing right lower lobe consolidation with right upper lobe opacities. Patient received Solu-Medrol and Levaquin at outside hospital.  No history of MRSA or prior respiratory Pseudomonas. History of severe COPD Rocephin was added. Respiratory culture growing Moraxella catarrhalis. Acute hyperkalemia: No ECG changes noted at admission.   Patient treated with insulin and Lasix and continuing to monitor potassium level. Acute hyperglycemia: Patient does not meet criteria for DKA. Does have history of poorly controlled DM2 and was given Solu-Medrol at outside hospital.  He was placed on insulin drip. Blood glucose goal of 160-180  Elevated troponin: S/p cardiac arrest.  ECG not showing STEMI. History of CAD s/p stenting, s/p CABG. no suspicion for intervention needed at this time. Acute hyperammonemia: No history of hyperammonia. CT abdomen taken on 3/16/2023 showing normal size liver patient did receive steroids at outside facility. No longer following up on pneumonia due to patient being DNR CC. Elevated liver enzymes: Patient had AST greater than ALT with increased liver enzymes. Concern of liver shock secondary to cardiac arrest with CPR. No concerning findings of the liver on CT abdomen. Tylenol and salicylate levels were normal  Acute hyperphosphatemia: Multifactorial likely secondary to insulin deficiency or acidosis. CKD stage 3: Baseline creatinine of 1.3-2.2 patient is established with Dr. Severiano Bloomer and nephrology. Renal dose medications as possible. Severe COPD:   History of severe COPD currently no audible wheezing. Patient given steroids at outside facility. HFmrEF: History of heart failure last echo in 2022 showing left ventricular EF of 40 to 45% repeat echo on 3/17/2023 showing EF of 28%. Patient currently on Levophed. Plan to maintain pressors until family present to terminally extubate. Cannabinoid positive urine drug screen: Found on admission. Not concerning finding due to no reported history indicating substance abuse. INITIAL H AND P AND ICU COURSE:  Mr. Carlo Tavarez is a 78-year-old male admitted to [facility] ICU on 3/16/2023 from [facility] s/p cardiac arrest with ROSC.     \"Patient has a past medical history former smoker, PFT 2022 FEV1 41%,  Severe COPD, Bronchial Asthma, ASHD s/p CABG-2008, s/p PCI 2020, Essential HPTN, Fixed ASD repair-Amplatzer device 2015, Ischemic cardiomyopathy, HRmrEF-ECHO 2022 LVEF 40-45% mild TR, CVA s/p Right Carotid Endarterectomy-2013, Left Carotid Endarterectomy-2014, DMT2, Dyslipidemia, CKD 3B (baseline Crea 1.3-2.2) , PAD s/p Bilateral Common Iliac Artery Angioplasty and Stenting-2020, Morbid Obesity     Isha Stanley presented to [facility] ER via [facility] as a transfer from [facility]. Patient was visiting friends and \"collapsed\" EMS was called and initial rhythm Asystole. [facility] reported that ROSC was obtained after 5 minutes and CPR/EPI and HCO3 given. He was orally intubated prior to transfer to the ER. On arrival to [facility] ER Positive for PEA Arrest with ROSC after 2 minutes of CPR/EPI given. Severe episodes of Bradycardia with HR 40's reported. Patient was hypertensive and did receive Labetalol 10 milligrams IVP X1. Solumedrol 125IVP/Levaquin 750 given prior to transfer. [facility] enroute did given Fentanyl IVP X1 and Duoneb Aerosol X1. Patient arrived to [facility] intubated and bagging fairly easily. Monitor Sinus Tachycardia. HR  and -160. No purposeful movement noted with GCS=3. No lab results from outlying facility arrived with transfer packet. \"    3/17/23  Patient continued to have rising potassium which was continuously treated with insulin and Lasix. Patient still presenting with hypercapnia on the ventilator and not showing any signs of spontaneous respirations. CT head showing diffuse cerebral edema with mass effect and hydrocephalus concerning for brain damage. Discussed with family findings and advised them of poor prognosis. Family stated that the patient did not want to be on a ventilator and did not want to be in a nursing home. We discussed options of continued treatment with potential for trach and PEG and ECF family stated that they would prefer DNR CC and to make him comfortable and terminally extubate.   Wife did have concern about patient transportation after death. Advised that assistance is available. Confirming with patient's son and patient's mother agreed that patient would not want to be continued on ventilator they requested that the terminal extubation to be held until tomorrow. Advised them that we will attempt to keep the patient alive until tomorrow but advised that there was no guarantee that he could make it through the night. They did request that if he did code throughout the night not to perform chest compressions. Patient's CODE STATUS was changed to DNR CC. Past Medical History:   Diagnosis Date    Asthma     WHEN HE WAS A CHILD    CAD (coronary artery disease)     COPD (chronic obstructive pulmonary disease) ([facility])     Diabetes mellitus ([address]     type II    Hyperlipidemia     Hypertension     Pneumonia     Unspecified cerebral artery occlusion with cerebral infarction     Venous stasis ulcer of left lower leg with edema of left lower leg [facility] 8/13/2020     Family History   Problem Relation Age of Onset    Diabetes Father       Social History     Socioeconomic History    Marital status:      Spouse name: Rudolph Robbins    Number of children: 3    Years of education: Not on file    Highest education level: Not on file   Occupational History    Not on file   Tobacco Use    Smoking status: Former     Packs/day: 1.00     Years: 51.00     Pack years: 51.00     Types: Cigarettes     Quit date: 02/2020     Years since quitting: 3.1    Smokeless tobacco: Never   Vaping Use    Vaping Use: Never used   Substance and Sexual Activity    Alcohol use:  Yes     Alcohol/week: 6.0 standard drinks     Types: 6 Cans of beer per week     Comment: \"hardly drink at all\"    Drug use: Yes     Types: Marijuana Deadra Catarino)     Comment: patient quit smoking marijuana [id number] but does gummies    Sexual activity: Not Currently     Partners: Female   Other Topics Concern    Not on file   Social History Narrative    Not on file     Social Determinants of Health     Financial Resource Strain: Not on file   Food Insecurity: Not on file   Transportation Needs: Not on file   Physical Activity: Not on file   Stress: Not on file   Social Connections: Not on file   Intimate Partner Violence: Not on file   Housing Stability: Not on file        Review of Systems   Unable to perform ROS: Intubated      Scheduled Meds:   furosemide  40 mg IntraVENous Once    insulin regular  10 Units IntraVENous Once    chlorhexidine  15 mL Mouth/Throat BID    sodium chloride flush  5-40 mL IntraVENous 2 times per day    polyvinyl alcohol  1 drop Both Eyes Q4H    Or    artificial tears   Both Eyes Q4H    tiotropium-olodaterol  2 puff Inhalation Daily    famotidine (PEPCID) injection  20 mg IntraVENous Daily    cefTRIAXone (ROCEPHIN) IV  2,000 mg IntraVENous Q24H     Continuous Infusions:   norepinephrine 5 mcg/min (03/17/23 1223)    sodium chloride 25 mL/hr (03/17/23 0916)    sodium chloride      propofol 10 mcg/kg/min (03/17/23 0911)    [Held by provider] insulin Stopped (03/16/23 1909)    dextrose      dextrose 5 % and 0.45 % NaCl 125 mL/hr at 03/17/23 0747       PHYSICAL EXAMINATION:  T: 100.4.  P: 113. RR: 16. B/P: 104/57. FiO2: 60%. O2 Sat: 94%. I/O: 700 mL out. 2.6 L in. Body mass index is 38.55 kg/m². GCS:   3    General:   Unwell and unresponsive elderly male. HEENT:  normocephalic and atraumatic. Pupils fixed and pinpoint  Neck: supple. No Thyromegaly. Lungs: clear to auscultation. No retractions  Cardiac: Rapid rate but regular. Distal pulses present  Abdomen: soft. Nontender. Extremities: Positive +2 pitting lower extremity edema, baseline per family. Vasculature: capillary refill < 3 seconds. Palpable dorsalis pedis pulses. Skin:  warm and dry. Psych: Unresponsive on ventilator  Lymph:  No supraclavicular adenopathy.   Neurologic: Unresponsive on ventilator, positive gag reflex  Data: (All radiographs, tracings, PFTs, and imaging are personally viewed and interpreted unless otherwise noted). , K 6.1, Cl 104, CO2 24, BUN 82, Cr 4.2, Ca 8  WBC 23.4, H/H 16.6/59.9, Platelets 308  Telemetry shows: Sinus tachycardia with no obvious STEMI. Micro: Pneumonia panel showing Moraxella catarrhalis positive. Patient on Rocephin. Seen with multidisciplinary ICU team.  Meets Continued ICU Level Care Criteria:    [x] Yes   [] No - Transfer Planned to listed location:  [] HOSPITALIST CONTACTED-      Case and plan discussed with Dr. Cori Dutton. Electronically signed by Ru Pandey MD  [address]   Patient seen by me including key components of medical care. Case discussed with resident physician. Advanced cerebral edema. Severe brain injury with no evidence of reversibility. Will await family decision regarding withdrawal of care and when. Italicized font, if present,  represents changes to the note made by me. CC time 35 minutes. Time was discontiguous. Time does not include procedure. Time does include my direct assessment of the patient and coordination of care. Time represents more than 50% of the time involved with patient care by the [address] [place] [address] team.  Electronically signed by Celso Hatchet.  Cori Dutton MD.

## 2023-03-17 NOTE — CARE COORDINATION
3/17/23, 12:55 PM EDT    DISCHARGE ON GOING EVALUATION    [facility] day: 1  Location: -14/014-A Reason for admit: Cardiac arrest [facility]) [I46.9]   Procedure:   3/15 Pre-hospital Code Blue; Found in asystole, CPR initiated by EMS w/ROSC in 5 minutes after initiation of CPR  3/15 Intubated by EMS  3/15 Code Blue at [address] w/ROSC in 2 minutes  3/15 CT Head: No acute findings; Old left frontal and left parietal lobe infarcts  3/15 CT Chest: Right lower lobe consolidation and right upper lobe pulmonary opacities suspicious for pneumonia.; Mildly dilated ascending aorta measuring 3.6 cm; Atherosclerotic calcifications including coronary artery calcifications  3/15 CT Abd/pelvis: 5-6 mm nonobstructive left renal stone. 2.7 x 2.5 cm left adrenal nodule, incompletely characterized and slightly increased in size. The differential diagnosis includes adenoma, metastases or other; Chemical shift MRI may be performed for further evaluation; Mild fusiform aneurysmal dilatation of the infrarenal abdominal aorta measuring 3 cm; Severe atherosclerosis. Small left inguinal hernia containing fat. Densities in the subcutaneous fat of the anterior lower abdomen bilaterally. This may represent contusion or edema. 3/15 CXR:   1. Tube positions as described above. 2. Bilateral interstitial edema. 2. Right upper lobe opacity, could be alveolar edema, or infiltrate   3/16 Echo: ef 25-30%  3/17 CT head: New diffuse cerebellar edema compatible with acute/subacute infarct. There is mass-effect with effacement of the perimesencephalic cistern and fourth ventricle causing mild hydrocephalus. New hypodensity in bilateral basal ganglia, bilateral frontal lobes and bilateral parietal lobes compatible with acute/subacute infarcts. Hx: Presented to [address] [facility]. Patient was visiting friends and 'collapsed'. CPR initiated when EMS arrived and found patient in asystole. ROSC attained 5 minutes after CPR initiated. Intubated by EMS. At [facility], patient again coded - PEA- and ROSC attained after 2 minutes. Hypertensive, labetalol given. Transferred to [facility] ICU by [facility]. No purposeful movement noted post-code. Barriers to Discharge: Intensivist following. Remains on vent with ETT: AC/PC 60%, peep 15. D5.45. Levo @ 5mcg. Propofol gtt. 3% saline. Nebs. IV rocephin. IV pepcid. IV lasix. SSI. Nebs. PCP: IVANIA Howe CNP  Readmission Risk Score: 17.2%  Patient Goals/Plan/Treatment Preferences: From home with wife. Required assistance with ADLs. Plan pending clinical course.

## 2023-03-17 NOTE — PROGRESS NOTES
1935 At bedside with wife Magda Brennan. Magda Brennan expressed wishes to withdrawal care on . Procedure explained. Support and encouragement provided. [address] notified. Okay to withdrawal care  1942 Pastoral care notified per family request.  Brandin Pen to bedside. Prayer provided  2000 Morphine given per order  2005 pt extubated with family at bedside  Via RT  2017 Monitor showing asystole  2018 without heart tones auscultated via myself and  Abhijeet PRUETT. Pupils fixed and dialled. without respirations .   2045 [facility] notified with NENITA

## 2023-03-17 NOTE — PLAN OF CARE
Problem: Chronic Conditions and Co-morbidities  Goal: Patient's chronic conditions and co-morbidity symptoms are monitored and maintained or improved  Outcome: Progressing  Flowsheets (Taken 3/17/2023 0400)  Care Plan - Patient's Chronic Conditions and Co-Morbidity Symptoms are Monitored and Maintained or Improved:   Monitor and assess patient's chronic conditions and comorbid symptoms for stability, deterioration, or improvement   Collaborate with multidisciplinary team to address chronic and comorbid conditions and prevent exacerbation or deterioration   Update acute care plan with appropriate goals if chronic or comorbid symptoms are exacerbated and prevent overall improvement and discharge     Problem: Discharge Planning  Goal: Discharge to home or other facility with appropriate resources  Outcome: Progressing  Flowsheets (Taken 3/17/2023 0400)  Discharge to home or other facility with appropriate resources:   Identify barriers to discharge with patient and caregiver   Arrange for needed discharge resources and transportation as appropriate   Identify discharge learning needs (meds, wound care, etc)     Problem: Pain  Goal: Verbalizes/displays adequate comfort level or baseline comfort level  Outcome: Progressing     Problem: Safety - Adult  Goal: Free from fall injury  Outcome: Progressing     Problem: Respiratory - Adult  Goal: Achieves optimal ventilation and oxygenation  Outcome: Progressing  Flowsheets (Taken 3/17/2023 0400)  Achieves optimal ventilation and oxygenation:   Assess for changes in respiratory status   Assess for changes in mentation and behavior   Position to facilitate oxygenation and minimize respiratory effort   Assess the need for suctioning and aspirate as needed   Assess and instruct to report shortness of breath or any respiratory difficulty   Respiratory therapy support as indicated     Problem: Nutrition Deficit:  Goal: Optimize nutritional status  Outcome: Progressing

## 2023-03-18 LAB
BACTERIA SPEC AEROBE CULT: NORMAL
BACTERIA UR CULT: NORMAL
CA-I BLD ISE-SCNC: 1.15 MMOL/L (ref 1.12–1.32)
CHLORIDE BLD-SCNC: 107 MEQ/L (ref 98–109)
GLUCOSE BLD-MCNC: 436 MG/DL (ref 70–108)
LACTATE BLD-SCNC: 2.3 MMOL/L (ref 0.5–1.9)
POTASSIUM BLD-SCNC: 6.6 MEQ/L (ref 3.5–4.9)
SODIUM BLD-SCNC: 138 MEQ/L (ref 138–146)

## 2023-03-18 NOTE — DISCHARGE SUMMARY
[facility] Critical Care Discharge Summary    Patient ID   Ninelouise Husain   1955  [id number]    Primary Care:   IVANIA Chu CNP    Admit date: 3/16/2023   Discharge date: 3/17/2023      Admitting Physician: Mesfin Queen MD   Discharge Physician: PETER Camilo DNP, IVANIA-CNP    Consultants: none    Invasive procedures: 3/15/2023 Oral intubation    Discharge Diagnoses:     Acute respiratory failure hypoxia and hypercapnia  Cardiac Arrest:   Anoxic brain injury  Sepsis  Severe Community Acquired Pneumonia: likely aspiration RUL and RLL- Moraxella Catarrhalis. NSTEMI  Myoglobinuria, likely Rhabdomyolysis  Acute hyperkalemia:     DMT2, uncontrolled:    Elevated troponin:  in the setting of cardiac arrest. EKG no STEMI, history of ischemic cardiomyopathy, ASHD s/p CABG, CAD s/p stenting. Acute Hyperammonemia:    Elevated liver enzymes:    Acute hyperphosphatemia:    Acute on CKD stage 3:   Severe COPD:   HFmrEF:     Cannabinoid, positive urine drug screen:   Urine drug screen did test positive for Fentanyl as well. Looking back in documentation [facility] did give patient Fentanyl in flight/transport to [facility].    PAD, history  Obesity, BMI 38.5       Patient Active Problem List   Diagnosis Code    Acute confusional state F05    Episode of abnormal behavior R46.89    Stroke (cerebrum) ([facility]) I63.9    Abnormal respiratory rate R06.9    PFO (patent foramen ovale) s/p closure 2015 Q21.12    History of CVA (cerebrovascular accident) Z86.73    ASD (atrial septal defect) Q21.10    Cerebral artery occlusion with cerebral infarction [facility]) I63.50    Left middle cerebral artery stroke [facility]) I63.512    Former smoker Z87.891    Hx of CABG Z95.1    History of CVA (cerebrovascular accident) without residual deficits Z86.73    Coronary artery disease involving native coronary artery of native heart without angina pectoris I25.10    Pure hypercholesterolemia E78.00    S/P angioplasty with stent 2015 Z95.820    Noncompliance F/u-and advised Z91.199    Sinus tachycardia R00.0    Essential hypertension Y42    Acute systolic congestive heart failure (HCC) I50.21    Bilateral leg edema R60.0    LAYA (acute kidney injury) (HCC) N17.9    Other specified chronic obstructive airways disease J44.9    Acute on chronic systolic (congestive) heart failure (HCC) I50.23    Chronic combined systolic and diastolic congestive heart failure (HCC) I50.42    CKD (chronic kidney disease), stage III ([facility]) N18.30    Ischemic cardiomyopathy EF 40% I25.5    Abnormal nuclear stress test R94.39    Class 2 severe obesity with serious comorbidity and body mass index (BMI) of 38.0 to 38.9 in adult ([facility] [address] E66.01, Z68.38    Cellulitis L03.90    Dental caries noted on examination K02.9    Wheezing R06.2    Unspecified disorder of circulatory system I99.9    Type 2 diabetes mellitus without complication ([facility]) R91.9    Type 2 diabetes mellitus with hyperglycemia ([facility]) E11.65    Class 2 severe obesity due to excess calories with serious comorbidity in adult [facility]) E66.01    Obstructive sleep apnea syndrome G47.33    Impotence of organic origin N52.9    Illness, unspecified R69    Adjustment disorder with anxious mood F43.22    Venous stasis ulcer of right lower leg with edema of right lower leg ([facility]) I83.019, I83.891, L97.919, R60.9    SOB (shortness of breath) on exertion R06.02    Angina, class III ([facility]) I20.9    Coronary artery disease due to lipid rich plaque I25.10, I25.83    PAD (peripheral artery disease) ([facility]) I73.9    History of arthroplasty of knee Z96.659    Chronic periodontitis, unspecified N04.13    Lichenification and lichen simplex chronicus L28.0    Localized superficial swelling, mass, or lump R22.9    Overweight with body mass index (BMI) of 27 to 27.9 in adult E66.3, Z68.27    Nocturia R35.1    Need for prophylactic vaccination and inoculation against influenza Z23    Acquired hypothyroidism E03.9    Tobacco use disorder F17.200    Occlusion and stenosis of unspecified carotid artery I65.29    Open wound of lower leg S81.809A    Morbid obesity (HCC) E66.01    Cardiac arrest ([facility] [address] I46.9       Brief history, reason for admission: Crystal Cash is a 69year old  male admitted to [facility] ICU 3/16/2023 s/p Cardiac Arrest. Patient was transferred from [facility] via [facility] Hospital Course:   Patient has a past medical history former smoker, PFT 2022 FEV1 41%,  Severe COPD, Bronchial Asthma, ASHD s/p CABG-2008, s/p PCI 2020, Essential HPTN, Fixed ASD repair-Amplatzer device 2015, Ischemic cardiomyopathy, HRmrEF-ECHO 2022 LVEF 40-45% mild TR, CVA s/p Right Carotid Endarterectomy-2013, Left Carotid Endarterectomy-2014, DMT2, Dyslipidemia, CKD 3B (baseline Crea 1.3-2.2) , PAD s/p Bilateral Common Iliac Artery Angioplasty and Stenting-2020, Morbid Obesity     Wendie Hoff presented to [facility] ER via [facility] as a transfer from [facility]. Patient was visiting friends and \"collapsed\" EMS was called and initial rhythm Asystole. [facility] reported that ROSC was obtained after 5 minutes and CPR/EPI and HCO3 given. He was orally intubated prior to transfer to the ER. On arrival to [facility] ER Positive for PEA Arrest with ROSC after 2 minutes of CPR/EPI given. Several episodes of Bradycardia with HR 40's reported. Patient was hypertensive and did receive Labetalol 10 milligrams IVP X1. Solumedrol 125IVP/Levaquin 750 given prior to transfer. [facility] enroute did given Fentanyl IVP X1 and Duoneb Aerosol X1. Patient arrived to [facility] intubated and bagging fairly easily. Monitor Sinus Tachycardia. HR  and -160. No purposeful movement noted with GCS=3. No role for targeted temperature management. 3/16/2023 EEG abnormal. ECHO LVEF 25-30% with severe global hypokinesis of the left ventricle. 4-hour ECG was obtained showing random leg myoclonus but no seizure activity.   24-hour CT head showing diffuse cerebral edema with mass effect and mild hydrocephalus. Placed on 3% hypertonic saline. 3/17/2023 Family meeting discussed findings and prognosis as well as goals of care. Family stated that the patient would not build to be kept on a ventilator. They want patient's CODE STATUS to be changed to DNR CC with plans for terminal extubation in the next 24 hours. 3/17/2023 Acute hyperkalemia and hypotension with need for Levophed requirements to go up. Family decided to terminal extubate TOD 2045    Significant Diagnostic Studies:   3/17/2023 sodium 142 potassium 7.1% 105 CO2 22 BUN 89 creatinine 0.9  Lactic acid 1.5  Glucose 326  Calcium is 8.1  White count 23.4 hemoglobin 16.6 hematocrit 59.9 platelet count 890  Respiratory pneumonia panel-Moraxella catarrhalis  Urine nicko large amount of blood 300 protein >100 RBC, few Bacteria  3/16/2023 0613 pH 7.10. pCO2 103 pO2 140 HCO3 32  3/17/2023 CTH-new diffuse cerebral edema compatible with acute/subacute infarct. There is mass effect with effacement of the perimesencephalic cistern and fourth ventricle causing mild hydrocephalus. New hypodensity in bilateral basal ganglia, bilateral frontal lobes and bilateral parietal lobes compatible with acute/subacute infarct  3/16/2023 ECHO LVEF 25 to 30%. Severe global hypokinesis left ventricle. Trace mitral regurg is present.     Medications: see computerized discharge medication list     Medication List        ASK your doctor about these medications      albuterol sulfate  (90 Base) MCG/ACT inhaler  Commonly known as: PROVENTIL;VENTOLIN;PROAIR     aspirin EC 81 MG EC tablet  Take 1 tablet by mouth daily     atorvastatin 40 MG tablet  Commonly known as: LIPITOR  TAKE ONE TABLET BY MOUTH ONCE DAILY     bumetanide 1 MG tablet  Commonly known as: BUMEX  Take 1 tablet by mouth 2 times daily     clopidogrel 75 MG tablet  Commonly known as: PLAVIX  take ONE tablet ONCE DAILY BY MOUTH     dapagliflozin 10 MG tablet  Commonly known as: Farxiga  Take 1 tablet by mouth every morning     gabapentin 300 MG capsule  Commonly known as: NEURONTIN     glipiZIDE 10 MG tablet  Commonly known as: GLUCOTROL     insulin lispro (1 Unit Dial) 100 UNIT/ML Sopn  Commonly known as: HUMALOG/ADMELOG     isosorbide mononitrate 30 MG extended release tablet  Commonly known as: IMDUR  TAKE ONE TABLET BY MOUTH ONCE DAILY     Levemir FlexTouch 100 UNIT/ML injection pen  Generic drug: insulin detemir     loratadine 10 MG tablet  Commonly known as: Claritin  Take 1 tablet by mouth daily For allergy symptoms. metOLazone 2.5 MG tablet  Commonly known as: ZAROXOLYN  Take 1 tablet by mouth Twice a Week Take on Monday's and      metoprolol tartrate 50 MG tablet  Commonly known as: LOPRESSOR  TAKE ONE TABLET BY MOUTH TWICE DAILY     nitroGLYCERIN 0.4 MG SL tablet  Commonly known as: Nitrostat  Place 1 tablet under the tongue every 5 minutes as needed for Chest pain up to max of 3 total doses. If no relief after 1 dose, call 911. Do not take with erectile dysfunction medications     prasugrel 10 MG Tabs  Commonly known as: Effient  Take 1 tablet by mouth daily     therapeutic multivitamin-minerals tablet     Trelegy Ellipta 100-62.5-25 MCG/ACT Aepb inhaler  Generic drug: fluticasone-umeclidin-vilant  Inhale 1 puff into the lungs daily            Patient Instructions:       Discharged Condition:   Disposition: discharged    PETER Caimlo DNP, APRN-CNP  3/17/2023    Time spent on discharge 45 minutes

## 2023-03-18 NOTE — PROGRESS NOTES
[facility] PROGRESS NOTE      Patient: Kera Gaona  Room #: 8E-18/495-B            YOB: 1955  Age: 69 y.o. Gender: male            Admit Date & Time: 3/16/2023  3:11 AM    Assessment:  Nurse called  to be at bedside with family as they prepare for withdrawal of care. Interventions:   joined family at bedside;  spouse and children and family members were present.  asked for a thought or two about the patient, with some sharing.  read Ps 23. A prayer followed. Outcomes:   gae encouragement to the family for the next several days. Plan:  Support family in this process. 1.    Electronically signed by Genet Christensen on 3/17/2023 at 8:18 PM.  [address] [facility]  [phone]             03/17/23 1938   Encounter Summary   Service Provided For: Family; Patient not available  (Family requested  for terminal extubation/withdrawal of care.)   Referral/Consult From: Nurse;Family   Support System Spouse; Children;Family members   Last Encounter  03/17/23   Complexity of Encounter Moderate   Begin Time 1938   End Time  2000   Total Time Calculated 22 min   Encounter    Type Family Care   Spiritual/Emotional needs   Type Spiritual Distress;Spiritual Support;Emotional Distress   Grief, Loss, and Adjustments   Type End of Life; Other (Comment)  (Withdrawal of care)   Assessment/Intervention/Outcome   Assessment Complicated grieving; Impaired resilience; Interrupted family processes   Intervention Active listening;Discussed meaning/purpose;Discussed death, afterlife; End of Life Care;Grief Care;Guided Imagery, Healing touch, etc.;Nurtured Hope;Prayer (assurance of)/[place];Read/Provided Scripture;Sustaining Presence/Ministry of presence   Outcome Comfort; Connection/Belonging;Encouraged;Engaged in conversation;Grieving;Less anxious, Less agitated

## 2023-03-18 NOTE — PROGRESS NOTES
[address] [place] [id number]  Notice of Patient Passing      Patient Name- Kamryn Fili Number- [de-identified]   Attending Physician- No att. providers found    Admitted on-3/16/2023  3:11 AM     On 03/17/2023 at 2018 patient was found in [phone] with:   Absence of vital signs. Absence of neurological response. Confirmed time of death at 2018. Physician or On-call Physician notified of time of death- yes    Family present at time of death- yes,    Spiritual care present at time of death- yes    Physician was notified and orders were obtained to release the body. Post-Mortem documentation completed; form printed, signed, and given to admitting.     Marcelle Harris RN   RN Nursing Supervisor  3/17/23   0257

## 2023-03-19 LAB
BACTERIA SPEC RESP CULT: NORMAL
GRAM STN SPEC: NORMAL
MYOGLOBIN UR-MCNC: < 1 MG/L (ref 0–1)

## 2023-03-20 LAB
BACTERIA SPEC RESP CULT: ABNORMAL
BACTERIA SPEC RESP CULT: ABNORMAL
GRAM STN SPEC: ABNORMAL
ORGANISM: ABNORMAL